# Patient Record
Sex: FEMALE | Race: BLACK OR AFRICAN AMERICAN | NOT HISPANIC OR LATINO | URBAN - METROPOLITAN AREA
[De-identification: names, ages, dates, MRNs, and addresses within clinical notes are randomized per-mention and may not be internally consistent; named-entity substitution may affect disease eponyms.]

---

## 2018-02-05 ENCOUNTER — EMERGENCY (EMERGENCY)
Facility: HOSPITAL | Age: 33
LOS: 1 days | Discharge: HOME | End: 2018-02-05
Attending: EMERGENCY MEDICINE
Payer: SELF-PAY

## 2018-02-05 VITALS
SYSTOLIC BLOOD PRESSURE: 134 MMHG | OXYGEN SATURATION: 100 % | HEART RATE: 106 BPM | WEIGHT: 145.06 LBS | RESPIRATION RATE: 18 BRPM | TEMPERATURE: 98 F | HEIGHT: 66 IN | DIASTOLIC BLOOD PRESSURE: 94 MMHG

## 2018-02-05 VITALS
SYSTOLIC BLOOD PRESSURE: 126 MMHG | DIASTOLIC BLOOD PRESSURE: 75 MMHG | HEART RATE: 88 BPM | OXYGEN SATURATION: 98 % | TEMPERATURE: 98 F | RESPIRATION RATE: 16 BRPM

## 2018-02-05 PROCEDURE — 94640 AIRWAY INHALATION TREATMENT: CPT

## 2018-02-05 PROCEDURE — 99283 EMERGENCY DEPT VISIT LOW MDM: CPT | Mod: 25

## 2018-02-05 PROCEDURE — 99285 EMERGENCY DEPT VISIT HI MDM: CPT

## 2018-02-05 RX ORDER — IPRATROPIUM/ALBUTEROL SULFATE 18-103MCG
6 AEROSOL WITH ADAPTER (GRAM) INHALATION ONCE
Qty: 0 | Refills: 0 | Status: COMPLETED | OUTPATIENT
Start: 2018-02-05 | End: 2018-02-05

## 2018-02-05 RX ADMIN — Medication 50 MILLIGRAM(S): at 13:30

## 2018-02-05 RX ADMIN — Medication 6 MILLILITER(S): at 13:30

## 2018-02-05 NOTE — ED ADULT NURSE NOTE - OBJECTIVE STATEMENT
sent in from urgent care for evaluation asthma exacerbation denies any breathing difficult on arrival

## 2018-02-05 NOTE — ED PROVIDER NOTE - OBJECTIVE STATEMENT
31 y/o female, pmhx asthma c/o asthma exacerbation while standing for bus in cold weather today. Confirms carrying MDI and needing nebulizer machine yesterday, has close follow up with PMD. Pt states she did not have access to her nebulizer, so came to ED. Denies fever/chills, sob/dyspnea, body aches, pregnancy or any other concerns. Has never required intubation for exacerbations.

## 2018-02-05 NOTE — ED PROVIDER NOTE - ATTENDING CONTRIBUTION TO CARE
pt comes in c/o SOb today while waiting for bus, asthma triggered by cold   s/p tx no wheezing   d/c on prednisone

## 2018-02-05 NOTE — ED PROVIDER NOTE - MEDICAL DECISION MAKING DETAILS
pt well appearing, no signs of respiratory distress, vss afebrile, saw pt s/p x2 nebulizer treatments, no wheezing upon exam, pt confirms full resolution, will give steroids as pt has needed nebulizer treatments more than normal, does not need refills of medication, no signs flu will dc home.

## 2019-11-11 NOTE — ED PROVIDER NOTE - TOBACCO USE
Never smoker

## 2020-11-12 ENCOUNTER — INPATIENT (INPATIENT)
Facility: HOSPITAL | Age: 35
LOS: 7 days | Discharge: HOME | DRG: 743 | End: 2020-11-20
Attending: OBSTETRICS & GYNECOLOGY | Admitting: OBSTETRICS & GYNECOLOGY
Payer: MEDICAID

## 2020-11-12 VITALS
HEIGHT: 66 IN | OXYGEN SATURATION: 98 % | HEART RATE: 117 BPM | SYSTOLIC BLOOD PRESSURE: 123 MMHG | WEIGHT: 141.98 LBS | RESPIRATION RATE: 18 BRPM | TEMPERATURE: 99 F | DIASTOLIC BLOOD PRESSURE: 70 MMHG

## 2020-11-12 RX ORDER — SODIUM CHLORIDE 9 MG/ML
1000 INJECTION INTRAMUSCULAR; INTRAVENOUS; SUBCUTANEOUS ONCE
Refills: 0 | Status: COMPLETED | OUTPATIENT
Start: 2020-11-12 | End: 2020-11-12

## 2020-11-12 RX ORDER — SODIUM CHLORIDE 9 MG/ML
3 INJECTION INTRAMUSCULAR; INTRAVENOUS; SUBCUTANEOUS EVERY 8 HOURS
Refills: 0 | Status: DISCONTINUED | OUTPATIENT
Start: 2020-11-12 | End: 2020-11-20

## 2020-11-12 RX ORDER — MORPHINE SULFATE 50 MG/1
6 CAPSULE, EXTENDED RELEASE ORAL ONCE
Refills: 0 | Status: DISCONTINUED | OUTPATIENT
Start: 2020-11-12 | End: 2020-11-12

## 2020-11-12 RX ORDER — ONDANSETRON 8 MG/1
4 TABLET, FILM COATED ORAL ONCE
Refills: 0 | Status: COMPLETED | OUTPATIENT
Start: 2020-11-12 | End: 2020-11-12

## 2020-11-12 RX ORDER — IOHEXOL 300 MG/ML
30 INJECTION, SOLUTION INTRAVENOUS ONCE
Refills: 0 | Status: COMPLETED | OUTPATIENT
Start: 2020-11-12 | End: 2020-11-13

## 2020-11-12 NOTE — ED ADULT TRIAGE NOTE - CHIEF COMPLAINT QUOTE
patient complained of severe abdominal pain, " all over ", patient claims same problem in September and diagnosed with inflammatory bowels.  Denies contact to person with known corona virus

## 2020-11-12 NOTE — ED PROVIDER NOTE - PHYSICAL EXAMINATION
Vital Signs Reviewed  GEN: Uncomfortable appearing, NAD, AAOx3  HEENT: NCAT, EOMI, MMM, Neck Supple  RESP: CTAB, No rales/rhonchi/wheezing  CV: RRR, S1S2, No murmurs  ABD: Diffusely tender with guarding, b/l CVA tenderness, No masses  Extrem/Skin: Equal pulses bilat, No cyanosis/edema/rashes  Neuro: No focal deficits

## 2020-11-12 NOTE — ED PROVIDER NOTE - CLINICAL SUMMARY MEDICAL DECISION MAKING FREE TEXT BOX
33 y/o female presents with severe abdominal pain with guarding and associated vomiting and diarrhea. Labs and CT pending. Given Morphine and Zofran. Will reassess. 33 y/o female presents with severe abdominal pain with guarding and associated vomiting and diarrhea. Labs and CT pending. Given Morphine and Zofran. Will reassess.  Pt with persistent pain and morphine redosed accordingly. Labs show high WBC 18K nml lactate and otw unremarkable. CT concerning for TOA and PID. Giving IV ABx. Pt seen by GYN in the ED and accepted to their service. Pt stable.

## 2020-11-12 NOTE — ED PROVIDER NOTE - OBJECTIVE STATEMENT
35 y/o female h/o asthma, presents a few hours of severe upper central/periumbilical abdominal pain. Reports pain started about 1 hour after eating a cheeseburger. Endorses 4-5 episodes of non-bloody non-bilious emesis, as well as a few episodes of diarrhea. States this is the second time she is having this kind of pain. Denies blood in the stool, vaginal bleeding, vaginal discharge, urinary symptoms, hx of bowel obstruction, hx of abdominal surgery other than a  7 years ago, or other recent illness or hospitalization.

## 2020-11-13 ENCOUNTER — TRANSCRIPTION ENCOUNTER (OUTPATIENT)
Age: 35
End: 2020-11-13

## 2020-11-13 DIAGNOSIS — N70.93 SALPINGITIS AND OOPHORITIS, UNSPECIFIED: ICD-10-CM

## 2020-11-13 PROBLEM — J45.909 UNSPECIFIED ASTHMA, UNCOMPLICATED: Chronic | Status: ACTIVE | Noted: 2018-02-05

## 2020-11-13 LAB
ALBUMIN SERPL ELPH-MCNC: 2.6 G/DL — LOW (ref 3.5–5)
ALP SERPL-CCNC: 93 U/L — SIGNIFICANT CHANGE UP (ref 40–120)
ALT FLD-CCNC: 17 U/L DA — SIGNIFICANT CHANGE UP (ref 10–60)
ANION GAP SERPL CALC-SCNC: 8 MMOL/L — SIGNIFICANT CHANGE UP (ref 5–17)
APPEARANCE UR: CLEAR — SIGNIFICANT CHANGE UP
APTT BLD: 29.8 SEC — SIGNIFICANT CHANGE UP (ref 27.5–35.5)
AST SERPL-CCNC: 13 U/L — SIGNIFICANT CHANGE UP (ref 10–40)
BACTERIA # UR AUTO: ABNORMAL /HPF
BASOPHILS # BLD AUTO: 0.04 K/UL — SIGNIFICANT CHANGE UP (ref 0–0.2)
BASOPHILS NFR BLD AUTO: 0.2 % — SIGNIFICANT CHANGE UP (ref 0–2)
BILIRUB SERPL-MCNC: 0.3 MG/DL — SIGNIFICANT CHANGE UP (ref 0.2–1.2)
BILIRUB UR-MCNC: NEGATIVE — SIGNIFICANT CHANGE UP
BUN SERPL-MCNC: 9 MG/DL — SIGNIFICANT CHANGE UP (ref 7–18)
CALCIUM SERPL-MCNC: 8.8 MG/DL — SIGNIFICANT CHANGE UP (ref 8.4–10.5)
CHLORIDE SERPL-SCNC: 101 MMOL/L — SIGNIFICANT CHANGE UP (ref 96–108)
CO2 SERPL-SCNC: 28 MMOL/L — SIGNIFICANT CHANGE UP (ref 22–31)
COLOR SPEC: YELLOW — SIGNIFICANT CHANGE UP
CREAT SERPL-MCNC: 0.92 MG/DL — SIGNIFICANT CHANGE UP (ref 0.5–1.3)
DIFF PNL FLD: NEGATIVE — SIGNIFICANT CHANGE UP
EOSINOPHIL # BLD AUTO: 0.12 K/UL — SIGNIFICANT CHANGE UP (ref 0–0.5)
EOSINOPHIL NFR BLD AUTO: 0.7 % — SIGNIFICANT CHANGE UP (ref 0–6)
EPI CELLS # UR: SIGNIFICANT CHANGE UP /HPF
GLUCOSE SERPL-MCNC: 122 MG/DL — HIGH (ref 70–99)
GLUCOSE UR QL: NEGATIVE — SIGNIFICANT CHANGE UP
HCG SERPL-ACNC: <1 MIU/ML — SIGNIFICANT CHANGE UP
HCT VFR BLD CALC: 31 % — LOW (ref 34.5–45)
HGB BLD-MCNC: 9.3 G/DL — LOW (ref 11.5–15.5)
HYALINE CASTS # UR AUTO: ABNORMAL /LPF
IMM GRANULOCYTES NFR BLD AUTO: 0.6 % — SIGNIFICANT CHANGE UP (ref 0–1.5)
INR BLD: 1.23 RATIO — HIGH (ref 0.88–1.16)
KETONES UR-MCNC: NEGATIVE — SIGNIFICANT CHANGE UP
LACTATE SERPL-SCNC: 1.1 MMOL/L — SIGNIFICANT CHANGE UP (ref 0.7–2)
LEUKOCYTE ESTERASE UR-ACNC: NEGATIVE — SIGNIFICANT CHANGE UP
LIDOCAIN IGE QN: 102 U/L — SIGNIFICANT CHANGE UP (ref 73–393)
LYMPHOCYTES # BLD AUTO: 0.66 K/UL — LOW (ref 1–3.3)
LYMPHOCYTES # BLD AUTO: 3.7 % — LOW (ref 13–44)
MCHC RBC-ENTMCNC: 23.7 PG — LOW (ref 27–34)
MCHC RBC-ENTMCNC: 30 GM/DL — LOW (ref 32–36)
MCV RBC AUTO: 79.1 FL — LOW (ref 80–100)
MONOCYTES # BLD AUTO: 0.7 K/UL — SIGNIFICANT CHANGE UP (ref 0–0.9)
MONOCYTES NFR BLD AUTO: 3.9 % — SIGNIFICANT CHANGE UP (ref 2–14)
NEUTROPHILS # BLD AUTO: 16.33 K/UL — HIGH (ref 1.8–7.4)
NEUTROPHILS NFR BLD AUTO: 90.9 % — HIGH (ref 43–77)
NITRITE UR-MCNC: NEGATIVE — SIGNIFICANT CHANGE UP
NRBC # BLD: 0 /100 WBCS — SIGNIFICANT CHANGE UP (ref 0–0)
PH UR: 6 — SIGNIFICANT CHANGE UP (ref 5–8)
PLATELET # BLD AUTO: 433 K/UL — HIGH (ref 150–400)
POTASSIUM SERPL-MCNC: 3.6 MMOL/L — SIGNIFICANT CHANGE UP (ref 3.5–5.3)
POTASSIUM SERPL-SCNC: 3.6 MMOL/L — SIGNIFICANT CHANGE UP (ref 3.5–5.3)
PROT SERPL-MCNC: 7.9 G/DL — SIGNIFICANT CHANGE UP (ref 6–8.3)
PROT UR-MCNC: 15
PROTHROM AB SERPL-ACNC: 14.5 SEC — HIGH (ref 10.6–13.6)
RBC # BLD: 3.92 M/UL — SIGNIFICANT CHANGE UP (ref 3.8–5.2)
RBC # FLD: 15.4 % — HIGH (ref 10.3–14.5)
RBC CASTS # UR COMP ASSIST: SIGNIFICANT CHANGE UP /HPF (ref 0–2)
SARS-COV-2 RNA SPEC QL NAA+PROBE: SIGNIFICANT CHANGE UP
SODIUM SERPL-SCNC: 137 MMOL/L — SIGNIFICANT CHANGE UP (ref 135–145)
SP GR SPEC: 1.02 — SIGNIFICANT CHANGE UP (ref 1.01–1.02)
UROBILINOGEN FLD QL: NEGATIVE — SIGNIFICANT CHANGE UP
WBC # BLD: 17.96 K/UL — HIGH (ref 3.8–10.5)
WBC # FLD AUTO: 17.96 K/UL — HIGH (ref 3.8–10.5)
WBC UR QL: SIGNIFICANT CHANGE UP /HPF (ref 0–5)

## 2020-11-13 PROCEDURE — 76830 TRANSVAGINAL US NON-OB: CPT | Mod: 26

## 2020-11-13 PROCEDURE — 99285 EMERGENCY DEPT VISIT HI MDM: CPT

## 2020-11-13 PROCEDURE — 74177 CT ABD & PELVIS W/CONTRAST: CPT | Mod: 26

## 2020-11-13 PROCEDURE — 76856 US EXAM PELVIC COMPLETE: CPT | Mod: 26

## 2020-11-13 RX ORDER — ONDANSETRON 8 MG/1
4 TABLET, FILM COATED ORAL EVERY 6 HOURS
Refills: 0 | Status: DISCONTINUED | OUTPATIENT
Start: 2020-11-13 | End: 2020-11-17

## 2020-11-13 RX ORDER — SODIUM CHLORIDE 9 MG/ML
1000 INJECTION INTRAMUSCULAR; INTRAVENOUS; SUBCUTANEOUS ONCE
Refills: 0 | Status: COMPLETED | OUTPATIENT
Start: 2020-11-13 | End: 2020-11-13

## 2020-11-13 RX ORDER — CEFOTETAN DISODIUM 1 G
2 VIAL (EA) INJECTION ONCE
Refills: 0 | Status: COMPLETED | OUTPATIENT
Start: 2020-11-13 | End: 2020-11-13

## 2020-11-13 RX ORDER — MORPHINE SULFATE 50 MG/1
4 CAPSULE, EXTENDED RELEASE ORAL ONCE
Refills: 0 | Status: DISCONTINUED | OUTPATIENT
Start: 2020-11-13 | End: 2020-11-13

## 2020-11-13 RX ORDER — ACETAMINOPHEN 500 MG
1000 TABLET ORAL ONCE
Refills: 0 | Status: COMPLETED | OUTPATIENT
Start: 2020-11-13 | End: 2020-11-13

## 2020-11-13 RX ORDER — SIMETHICONE 80 MG/1
80 TABLET, CHEWABLE ORAL EVERY 4 HOURS
Refills: 0 | Status: DISCONTINUED | OUTPATIENT
Start: 2020-11-13 | End: 2020-11-20

## 2020-11-13 RX ORDER — ACETAMINOPHEN 500 MG
650 TABLET ORAL EVERY 6 HOURS
Refills: 0 | Status: DISCONTINUED | OUTPATIENT
Start: 2020-11-13 | End: 2020-11-13

## 2020-11-13 RX ORDER — CEFOTETAN DISODIUM 1 G
2 VIAL (EA) INJECTION EVERY 12 HOURS
Refills: 0 | Status: DISCONTINUED | OUTPATIENT
Start: 2020-11-14 | End: 2020-11-17

## 2020-11-13 RX ORDER — MORPHINE SULFATE 50 MG/1
4 CAPSULE, EXTENDED RELEASE ORAL EVERY 4 HOURS
Refills: 0 | Status: DISCONTINUED | OUTPATIENT
Start: 2020-11-13 | End: 2020-11-17

## 2020-11-13 RX ORDER — METOCLOPRAMIDE HCL 10 MG
10 TABLET ORAL EVERY 6 HOURS
Refills: 0 | Status: DISCONTINUED | OUTPATIENT
Start: 2020-11-13 | End: 2020-11-17

## 2020-11-13 RX ORDER — ACETAMINOPHEN 500 MG
650 TABLET ORAL ONCE
Refills: 0 | Status: COMPLETED | OUTPATIENT
Start: 2020-11-13 | End: 2020-11-13

## 2020-11-13 RX ORDER — KETOROLAC TROMETHAMINE 30 MG/ML
15 SYRINGE (ML) INJECTION EVERY 6 HOURS
Refills: 0 | Status: DISCONTINUED | OUTPATIENT
Start: 2020-11-13 | End: 2020-11-17

## 2020-11-13 RX ORDER — SODIUM CHLORIDE 9 MG/ML
1000 INJECTION, SOLUTION INTRAVENOUS
Refills: 0 | Status: DISCONTINUED | OUTPATIENT
Start: 2020-11-13 | End: 2020-11-16

## 2020-11-13 RX ORDER — CEFOTETAN DISODIUM 1 G
VIAL (EA) INJECTION
Refills: 0 | Status: DISCONTINUED | OUTPATIENT
Start: 2020-11-13 | End: 2020-11-17

## 2020-11-13 RX ADMIN — Medication 100 GRAM(S): at 23:57

## 2020-11-13 RX ADMIN — MORPHINE SULFATE 6 MILLIGRAM(S): 50 CAPSULE, EXTENDED RELEASE ORAL at 02:29

## 2020-11-13 RX ADMIN — Medication 15 MILLIGRAM(S): at 12:25

## 2020-11-13 RX ADMIN — IOHEXOL 30 MILLILITER(S): 300 INJECTION, SOLUTION INTRAVENOUS at 01:42

## 2020-11-13 RX ADMIN — SODIUM CHLORIDE 1000 MILLILITER(S): 9 INJECTION INTRAMUSCULAR; INTRAVENOUS; SUBCUTANEOUS at 07:13

## 2020-11-13 RX ADMIN — SODIUM CHLORIDE 125 MILLILITER(S): 9 INJECTION, SOLUTION INTRAVENOUS at 15:35

## 2020-11-13 RX ADMIN — MORPHINE SULFATE 4 MILLIGRAM(S): 50 CAPSULE, EXTENDED RELEASE ORAL at 06:11

## 2020-11-13 RX ADMIN — Medication 400 MILLIGRAM(S): at 23:55

## 2020-11-13 RX ADMIN — Medication 15 MILLIGRAM(S): at 12:40

## 2020-11-13 RX ADMIN — Medication 110 MILLIGRAM(S): at 23:57

## 2020-11-13 RX ADMIN — SODIUM CHLORIDE 1000 MILLILITER(S): 9 INJECTION INTRAMUSCULAR; INTRAVENOUS; SUBCUTANEOUS at 01:42

## 2020-11-13 RX ADMIN — Medication 15 MILLIGRAM(S): at 19:40

## 2020-11-13 RX ADMIN — Medication 110 MILLIGRAM(S): at 06:59

## 2020-11-13 RX ADMIN — SODIUM CHLORIDE 3 MILLILITER(S): 9 INJECTION INTRAMUSCULAR; INTRAVENOUS; SUBCUTANEOUS at 21:04

## 2020-11-13 RX ADMIN — MORPHINE SULFATE 4 MILLIGRAM(S): 50 CAPSULE, EXTENDED RELEASE ORAL at 17:41

## 2020-11-13 RX ADMIN — SODIUM CHLORIDE 3 MILLILITER(S): 9 INJECTION INTRAMUSCULAR; INTRAVENOUS; SUBCUTANEOUS at 07:12

## 2020-11-13 RX ADMIN — Medication 15 MILLIGRAM(S): at 19:55

## 2020-11-13 RX ADMIN — MORPHINE SULFATE 6 MILLIGRAM(S): 50 CAPSULE, EXTENDED RELEASE ORAL at 01:59

## 2020-11-13 RX ADMIN — Medication 100 GRAM(S): at 07:00

## 2020-11-13 RX ADMIN — MORPHINE SULFATE 4 MILLIGRAM(S): 50 CAPSULE, EXTENDED RELEASE ORAL at 05:41

## 2020-11-13 RX ADMIN — Medication 650 MILLIGRAM(S): at 05:41

## 2020-11-13 RX ADMIN — MORPHINE SULFATE 4 MILLIGRAM(S): 50 CAPSULE, EXTENDED RELEASE ORAL at 17:55

## 2020-11-13 RX ADMIN — Medication 650 MILLIGRAM(S): at 06:11

## 2020-11-13 RX ADMIN — SIMETHICONE 80 MILLIGRAM(S): 80 TABLET, CHEWABLE ORAL at 21:05

## 2020-11-13 RX ADMIN — ONDANSETRON 4 MILLIGRAM(S): 8 TABLET, FILM COATED ORAL at 01:42

## 2020-11-13 NOTE — H&P ADULT - HISTORY OF PRESENT ILLNESS
HPI: 35 y/o   LMP: 10/26/2020 presents to the ED with acute onset of acute abdominal pain since yesterday. Pt states she had a hamburger earlier yesterday afternoon and after she ate she started to feel severe abdominal pain with 4 episodes of vomiting and diarrhea. Pt states she had similar pain 1 month ago after eating beef and went to [facility] ED and was told she had "abdominal inflammation." Pt denies any vaginal bleeding, vaginal discharge, cp, sob, dizziness, palpitations, n/v/d/c, fever, chills or any other complaints.   GYN care at [facility] (Dr. Gem Crockett)    pob/gynhx:   1 mETOP  1 etop w d&c   1    2013 1 c/s   Pt states she has one fibriod and ovarian cyst, pt denies std's, abn pap smear  reports regular menses, lasting 5 days long  pmhx: asthma, anemia   pshx: c/s x1, d&c x1  all: pineapple, kiwi, shellfish   meds: no meds  sochx: Pt denies etoh/drug/tobacco use  pt denies h/o anxiety or depression    REVIEW OF SYSTEMS: see HPI	    PE:  Vital Signs Last 24 Hrs  T(C): 38.4 (2020 05:14), Max: 38.4 (2020 05:14)  T(F): 101.1 (2020 05:14), Max: 101.1 (2020 05:14)  HR: 120 (2020 05:14) (117 - 120)  BP: 126/81 (2020 05:14) (123/70 - 126/81)  BP(mean): --  RR: 18 (2020 05:14) (18 - 18)  SpO2: 98% (2020 05:14) (98% - 98%)    Gen: A&Ox3, NAD, appears in pain and ambulating very slowly  Abd/pelvic exam: unable to perform at this time, pt was called to radiology for pelvic sonogram     LABS:                        9.3    17.96 )-----------( 433      ( 2020 01:16 )             31.0     11-13    137  |  101  |  9   ----------------------------<  122<H>  3.6   |  28  |  0.92    Ca    8.8      2020 01:16    TPro  7.9  /  Alb  2.6<L>  /  TBili  0.3  /  DBili  x   /  AST  13  /  ALT  17  /  AlkPhos  93  11-13    PT/INR - ( 2020 01:16 )   PT: 14.5 sec;   INR: 1.23 ratio         PTT - ( 2020 01:16 )  PTT:29.8 sec  Urinalysis Basic - ( 2020 01:16 )    Color: Yellow / Appearance: Clear / S.020 / pH: x  Gluc: x / Ketone: Negative  / Bili: Negative / Urobili: Negative   Blood: x / Protein: 15 / Nitrite: Negative   Leuk Esterase: Negative / RBC: 0-2 /HPF / WBC 0-2 /HPF   Sq Epi: x / Non Sq Epi: Few /HPF / Bacteria: Few /HPF

## 2020-11-13 NOTE — H&P ADULT - PROBLEM SELECTOR PLAN 1
-admit for IV antibiotics for left TOA  -start IV cefotetan and doxycycline   -f/u pelvic sonogram, reassess patient after patient returns from sonogram   -f/u cultures  -pt counseled of possible surgical intervention if unresponsive to IV antibiotics   -pt seen at bedside with Dr. Cornejo, attending on call -admit for IV antibiotics for left TOA  -start IV cefotetan and doxycycline   -f/u pelvic sonogram, reassess patient after patient returns from sonogram   -f/u cultures  -pt counseled of possible surgical intervention if unresponsive to IV antibiotics   -pt seen at bedside and case discussed with Dr. Cornejo, attending on call

## 2020-11-13 NOTE — PATIENT PROFILE ADULT - NSPRESCRALCAMT_GEN_A_NUR
Action 1: Continue Start Regimen: Ketoconazole shampoo with Tsal \\nFluocinonide solution qhs on flares Detail Level: Zone 1 or 2

## 2020-11-13 NOTE — CONSULT NOTE ADULT - ASSESSMENT
A/p: 35 y/o  LMP: 10/26/2020 presents to ED with acute onset of severe diffuse abdominal pain, vomiting and diarrhea, elevated WBC count, fever 101.1 and high suspicion of left TOA by CT scan findings   -admit for IV antibiotics for left TOA  -start IV cefotetan and doxycycline   -f/u pelvic sonogram, reassess patient after patient returns from sonogram   -f/u cultures  -pt counseled of possible surgical intervention if unresponsive to IV antibiotics   -pt seen at bedside with Dr. Cornejo, attending on call

## 2020-11-13 NOTE — H&P ADULT - ASSESSMENT
A/p: 33 y/o  LMP: 10/26/2020 presents to ED with acute onset of severe diffuse abdominal pain, vomiting and diarrhea, elevated WBC count, fever 101.1 and high suspicion of left TOA by CT scan findings

## 2020-11-13 NOTE — H&P ADULT - NSHPLABSRESULTS_GEN_ALL_CORE
c< from: CT Abdomen and Pelvis w/ Oral Cont and w/ IV Cont (11.13.20 @ 04:58) >    EXAM:  CT ABDOMEN AND PELVIS OC IC                            PROCEDURE DATE:  11/13/2020        INTERPRETATION:  CLINICAL INFORMATION:  Abdominal pain, leukocytosis  COMPARISON: None.  PROCEDURE:  CT of the Abdomen and Pelvis was performed with intravenous contrast.  Intravenous contrast:   90 ml Omnipaque 350.  Oral contrast: positive contrast was administered.  Sagittal and coronal reformats were performed.    FINDINGS:  LIMITATIONS: None.    LOWER CHEST: Within normal limits.  ABDOMINAL WALL: Within normal limits.  VESSELS: Within normal limits.  BOWEL: Loops of small bowel with moderate mural thickening of loss of the abdomen and perienteric fat stranding/fluid. No gross abnormalities of the colon. Appendix normal.    LIVER: Within normal limits.  BILE DUCTS: Normal caliber  GALLBLADDER: Within normal limits.  SPLEEN: Within normal limits.  PANCREAS: Within normal limits.  ADRENALS: Within normal limits.  KIDNEYS/URETERS: No renal stones or hydronephrosis.    BLADDER: Within normal limits.  REPRODUCTIVE ORGANS: 3.7 cm hyperdense lesion within the dorsal myometrium, likely fibroid. Right ovary is enlarged, measuring approximately 5.2 x 3.8 x 5.0 cm. The left ovary is enlarged by an irregular, peripherally enhancing fluid collection measuring approximately 6.0 x 3.6 x 4.5 cm. Additional smaller fluid collection in the left adnexa measuring 2.8 cm (series 2, image 101).    PERITONEUM: Moderate free fluid in the pelvis with possible peritoneal enhancement.  RETROPERITONEUM/LYMPH NODES: Nonspecific mildly enlarged left retroperitoneal lymph nodes measuring up to 1.1 cm in short axis.    BONES: No acute osseous pathology.    IMPRESSION:  1. Large irregular fluid collection expanding the left ovary. Additional small left adnexal fluid collection. Moderate free fluid in the pelvis with possible peritoneal enhancement. The right ovary is also enlarged. Pelvic ultrasound is recommended to evaluate. Primary considerations on the left are ruptured hemorrhagic cyst and tubo-ovarian abscess.  2. The appearance is suspicious for enteritis.  3. Probable 3.7 cm fibroid      ERIC LEIVA MD; Attending Radiologist

## 2020-11-14 ENCOUNTER — RESULT REVIEW (OUTPATIENT)
Age: 35
End: 2020-11-14

## 2020-11-14 DIAGNOSIS — R00.0 TACHYCARDIA, UNSPECIFIED: ICD-10-CM

## 2020-11-14 DIAGNOSIS — J45.909 UNSPECIFIED ASTHMA, UNCOMPLICATED: ICD-10-CM

## 2020-11-14 LAB
ABO RH CONFIRMATION: SIGNIFICANT CHANGE UP
APTT BLD: 26.9 SEC — LOW (ref 27.5–35.5)
BASOPHILS # BLD AUTO: 0.04 K/UL — SIGNIFICANT CHANGE UP (ref 0–0.2)
BASOPHILS NFR BLD AUTO: 0.2 % — SIGNIFICANT CHANGE UP (ref 0–2)
BLD GP AB SCN SERPL QL: SIGNIFICANT CHANGE UP
CK MB BLD-MCNC: <2.4 % — SIGNIFICANT CHANGE UP (ref 0–3.5)
CK MB CFR SERPL CALC: <1 NG/ML — SIGNIFICANT CHANGE UP (ref 0–3.6)
CK SERPL-CCNC: 42 U/L — SIGNIFICANT CHANGE UP (ref 21–215)
EOSINOPHIL # BLD AUTO: 0.01 K/UL — SIGNIFICANT CHANGE UP (ref 0–0.5)
EOSINOPHIL NFR BLD AUTO: 0 % — SIGNIFICANT CHANGE UP (ref 0–6)
HCT VFR BLD CALC: 33 % — LOW (ref 34.5–45)
HGB BLD-MCNC: 9.8 G/DL — LOW (ref 11.5–15.5)
IMM GRANULOCYTES NFR BLD AUTO: 0.9 % — SIGNIFICANT CHANGE UP (ref 0–1.5)
INR BLD: 1.51 RATIO — HIGH (ref 0.88–1.16)
LYMPHOCYTES # BLD AUTO: 1.5 K/UL — SIGNIFICANT CHANGE UP (ref 1–3.3)
LYMPHOCYTES # BLD AUTO: 7.1 % — LOW (ref 13–44)
MCHC RBC-ENTMCNC: 23.4 PG — LOW (ref 27–34)
MCHC RBC-ENTMCNC: 29.7 GM/DL — LOW (ref 32–36)
MCV RBC AUTO: 78.9 FL — LOW (ref 80–100)
MONOCYTES # BLD AUTO: 1.11 K/UL — HIGH (ref 0–0.9)
MONOCYTES NFR BLD AUTO: 5.2 % — SIGNIFICANT CHANGE UP (ref 2–14)
NEUTROPHILS # BLD AUTO: 18.41 K/UL — HIGH (ref 1.8–7.4)
NEUTROPHILS NFR BLD AUTO: 86.6 % — HIGH (ref 43–77)
NRBC # BLD: 0 /100 WBCS — SIGNIFICANT CHANGE UP (ref 0–0)
PLATELET # BLD AUTO: 435 K/UL — HIGH (ref 150–400)
PROTHROM AB SERPL-ACNC: 17.6 SEC — HIGH (ref 10.6–13.6)
RBC # BLD: 4.18 M/UL — SIGNIFICANT CHANGE UP (ref 3.8–5.2)
RBC # FLD: 15.8 % — HIGH (ref 10.3–14.5)
SARS-COV-2 IGG SERPL QL IA: NEGATIVE — SIGNIFICANT CHANGE UP
SARS-COV-2 IGM SERPL IA-ACNC: 0.11 INDEX — SIGNIFICANT CHANGE UP
TROPONIN I SERPL-MCNC: <0.015 NG/ML — SIGNIFICANT CHANGE UP (ref 0–0.04)
WBC # BLD: 21.26 K/UL — HIGH (ref 3.8–10.5)
WBC # FLD AUTO: 21.26 K/UL — HIGH (ref 3.8–10.5)

## 2020-11-14 PROCEDURE — 99233 SBSQ HOSP IP/OBS HIGH 50: CPT | Mod: GC

## 2020-11-14 RX ORDER — MONTELUKAST 4 MG/1
10 TABLET, CHEWABLE ORAL AT BEDTIME
Refills: 0 | Status: DISCONTINUED | OUTPATIENT
Start: 2020-11-14 | End: 2020-11-20

## 2020-11-14 RX ORDER — ACETAMINOPHEN 500 MG
1000 TABLET ORAL ONCE
Refills: 0 | Status: COMPLETED | OUTPATIENT
Start: 2020-11-14 | End: 2020-11-14

## 2020-11-14 RX ORDER — HYDROMORPHONE HYDROCHLORIDE 2 MG/ML
0.5 INJECTION INTRAMUSCULAR; INTRAVENOUS; SUBCUTANEOUS
Refills: 0 | Status: DISCONTINUED | OUTPATIENT
Start: 2020-11-14 | End: 2020-11-14

## 2020-11-14 RX ORDER — HYDROMORPHONE HYDROCHLORIDE 2 MG/ML
1 INJECTION INTRAMUSCULAR; INTRAVENOUS; SUBCUTANEOUS
Refills: 0 | Status: DISCONTINUED | OUTPATIENT
Start: 2020-11-14 | End: 2020-11-14

## 2020-11-14 RX ORDER — ONDANSETRON 8 MG/1
4 TABLET, FILM COATED ORAL ONCE
Refills: 0 | Status: DISCONTINUED | OUTPATIENT
Start: 2020-11-14 | End: 2020-11-14

## 2020-11-14 RX ADMIN — SODIUM CHLORIDE 3 MILLILITER(S): 9 INJECTION INTRAMUSCULAR; INTRAVENOUS; SUBCUTANEOUS at 22:08

## 2020-11-14 RX ADMIN — MORPHINE SULFATE 4 MILLIGRAM(S): 50 CAPSULE, EXTENDED RELEASE ORAL at 14:30

## 2020-11-14 RX ADMIN — Medication 1000 MILLIGRAM(S): at 00:30

## 2020-11-14 RX ADMIN — Medication 400 MILLIGRAM(S): at 17:44

## 2020-11-14 RX ADMIN — SODIUM CHLORIDE 3 MILLILITER(S): 9 INJECTION INTRAMUSCULAR; INTRAVENOUS; SUBCUTANEOUS at 05:11

## 2020-11-14 RX ADMIN — Medication 110 MILLIGRAM(S): at 21:46

## 2020-11-14 RX ADMIN — Medication 100 GRAM(S): at 08:14

## 2020-11-14 RX ADMIN — Medication 15 MILLIGRAM(S): at 03:30

## 2020-11-14 RX ADMIN — MORPHINE SULFATE 4 MILLIGRAM(S): 50 CAPSULE, EXTENDED RELEASE ORAL at 06:59

## 2020-11-14 RX ADMIN — HYDROMORPHONE HYDROCHLORIDE 1 MILLIGRAM(S): 2 INJECTION INTRAMUSCULAR; INTRAVENOUS; SUBCUTANEOUS at 22:30

## 2020-11-14 RX ADMIN — Medication 15 MILLIGRAM(S): at 03:11

## 2020-11-14 RX ADMIN — MORPHINE SULFATE 4 MILLIGRAM(S): 50 CAPSULE, EXTENDED RELEASE ORAL at 06:44

## 2020-11-14 RX ADMIN — Medication 110 MILLIGRAM(S): at 08:14

## 2020-11-14 RX ADMIN — SIMETHICONE 80 MILLIGRAM(S): 80 TABLET, CHEWABLE ORAL at 03:12

## 2020-11-14 RX ADMIN — Medication 100 GRAM(S): at 21:45

## 2020-11-14 RX ADMIN — HYDROMORPHONE HYDROCHLORIDE 1 MILLIGRAM(S): 2 INJECTION INTRAMUSCULAR; INTRAVENOUS; SUBCUTANEOUS at 22:15

## 2020-11-14 RX ADMIN — SODIUM CHLORIDE 3 MILLILITER(S): 9 INJECTION INTRAMUSCULAR; INTRAVENOUS; SUBCUTANEOUS at 13:36

## 2020-11-14 RX ADMIN — Medication 15 MILLIGRAM(S): at 09:27

## 2020-11-14 NOTE — CONSULT NOTE ADULT - ATTENDING COMMENTS
Patient was interviewed and examined at the bedside     We were asked to see her because of persistent tachycardia.     Alert, cooperative woman, drinking tea  Vital Signs Last 24 Hrs  T(C): 37.5 (14 Nov 2020 19:00), Max: 38.6 (14 Nov 2020 00:03)  T(F): 99.5 (14 Nov 2020 19:00), Max: 101.5 (14 Nov 2020 00:03)  HR: 135 (14 Nov 2020 19:00) (102 - 151)  BP: 136/85 (14 Nov 2020 19:00) (113/73 - 139/85)  BP(mean): --  RR: 18 (14 Nov 2020 19:00) (17 - 19)  SpO2: 100% (14 Nov 2020 19:00) (94% - 100%)  Lungs, decreased bs, no wheeze or rales  Cor, RRR at 120/minute  Abdomen, diffusely tender, no rebound, bs, present, patient's heartbeat is prominent via abdomen  Neurological, intact                        9.8    21.26 )-----------( 435      ( 14 Nov 2020 09:21 )             33.0   11-13    137  |  101  |  9   ----------------------------<  122<H>  3.6   |  28  |  0.92    Ca    8.8      13 Nov 2020 01:16    TPro  7.9  /  Alb  2.6<L>  /  TBili  0.3  /  DBili  x   /  AST  13  /  ALT  17  /  AlkPhos  93  11-13  < from: US Pelvis Complete (US Pelvis Complete .) (11.13.20 @ 06:58) >    IMPRESSION:  The study is unfortunately fairly limited. The dominant lesion in the left axilla is not well characterized and again probably represents a ruptured hemorrhagic cyst or tubo-ovarian abscess. Arteriovenous flow is present bilaterally.    < end of copied text >    r< from: CT Abdomen and Pelvis w/ Oral Cont and w/ IV Cont (11.13.20 @ 04:58) >    IMPRESSION:  1. Large irregular fluid collection expanding the left ovary. Additional small left adnexal fluid collection. Moderate free fluid in the pelvis with possible peritoneal enhancement. The right ovary is also enlarged. Pelvic ultrasound is recommended to evaluate. Primary considerations on the left are ruptured hemorrhagic cyst and tubo-ovarian abscess.  2. The appearance is suspicious for enteritis.  3. Probable 3.7 cm fibroid.    < end of copied text >    IMP:  Tachycardia, likely secondary to sepsis, possibly exacerbated by the use of albuterol.  Patient has clinical and radiological evidence of PID          with TOA and possible rupture.            No wheezing, no hypoxia.  Asthma is not active, at present.   Suggest, resume montelukast               Agree that surgical intervention for source control is appropriate, as per GYN.                 Patient has taken liquids about 5:00 PM.

## 2020-11-14 NOTE — CONSULT NOTE ADULT - ASSESSMENT
Patient is a 34y old  Female who presents with a chief complaint of acute onset severe abdominal pain, vomiting, high suspicion for left TOA (14 Nov 2020 16:39)  ADmitted for L tuboovarian abscess and peritonitis.    IM team was consulted for persistent tachycardia.

## 2020-11-14 NOTE — PROGRESS NOTE ADULT - SUBJECTIVE AND OBJECTIVE BOX
patient states she feels better than yesterday, had fever at midnight.   complains of abdominal pain since Thursday.     abdomen: distended, diffuse guarding, no rebond

## 2020-11-14 NOTE — CONSULT NOTE ADULT - PROBLEM SELECTOR RECOMMENDATION 3
Pt has PMH of Asthma  Pt takes Montelukast 10mg at home and ALlbuterol pump PRN  Pt advised to hold the albuterol pump as it is contributing to the tachycardia   Recc to restart home Montelukast 10mg bedtime.

## 2020-11-14 NOTE — BRIEF OPERATIVE NOTE - OPERATION/FINDINGS
7x7 cm left tubo-ovarian abscess adhere to the left pelvic wall  Exudate on bowel proximate to the abscess  Right ovary wnl with exudate

## 2020-11-14 NOTE — BRIEF OPERATIVE NOTE - NSICDXBRIEFPROCEDURE_GEN_ALL_CORE_FT
PROCEDURES:  Open resection of left fallopian tube 14-Nov-2020 22:27:22  Tyson Wang  Exploratory laparotomy 14-Nov-2020 22:25:31  Tyson Wang

## 2020-11-14 NOTE — CONSULT NOTE ADULT - PROBLEM SELECTOR RECOMMENDATION 2
Pt noted to be tachycardia x 2 days  Pt mentions occasional palpitations after taking albuterol inh or Neb treatments  HR -110 - 120   ekg- NSR  Tachycardia likely due to Underlying pelvic infection vs Albuterol inhaler  Pt recommended to stop the albutertol pump.   Monitor HR   F/U Trops

## 2020-11-14 NOTE — PROGRESS NOTE ADULT - SUBJECTIVE AND OBJECTIVE BOX
went to see Mrs Damon, she states her abdominal pain has improved, She is tolerating clear diet.   Her HR is in the range of 120s since 2 pm, BP wnl.   Patient took albuterol before 2 pm for her asthma.   She also has hx of anxiety.   Will continue to monitor her VS and symptoms.    went to see Mrs Damon, she states her abdominal pain has improved, She is tolerating clear diet.   Her HR is in the range of 120s since 2 pm, BP wnl.   Patient took albuterol before 2 pm for her asthma.   She also has hx of anxiety.   EKG shows sinus tachy   Will continue to monitor her VS and symptoms.

## 2020-11-14 NOTE — PROGRESS NOTE ADULT - ASSESSMENT
left TOA on IV antibiotics   s/p fever at midnight   NPO for now  CBC stat   type and screen sent   IV antibiotics now   will continue to monitor, possible surgical intervention if no improvement of symptoms

## 2020-11-14 NOTE — CHART NOTE - NSCHARTNOTEFT_GEN_A_CORE
patient has develop a second episode of fever, her HR has increased to 150s  we are concern of a possible ruptured or sepsis; we have discussed this possibility with the patient and recommend to do a diagnostic laparoscopy with removal of TOA, possible salpingectomy/oophorectomy   patient agrees with plan   consent taken   OR notified      Vital Signs Last 24 Hrs  T(C): 38.6 (14 Nov 2020 17:33), Max: 38.6 (14 Nov 2020 00:03)  T(F): 101.5 (14 Nov 2020 17:33), Max: 101.5 (14 Nov 2020 00:03)  HR: 151 (14 Nov 2020 17:33) (102 - 151)  BP: 139/85 (14 Nov 2020 17:33) (113/73 - 139/85)  BP(mean): --  RR: 18 (14 Nov 2020 17:33) (17 - 19)  SpO2: 100% (14 Nov 2020 17:33) (94% - 100%)

## 2020-11-14 NOTE — CHART NOTE - NSCHARTNOTEFT_GEN_A_CORE
I have explained to the patient in detailed the nature of the operative procedure to be performed.  The patient has been medically cleared.  The purpose of this procedure is twofold, first: diagnostic and second: therapeutic.  An exploratory laparotomy will be performed with removal of TOA, possible oophorectomy.  Other procedures are possible according to the pathology encountered.  The patient was told that the uterus will need to be removed to completely take care of the infection. She is reluctant to have a hysterectomy so we will try not to perform this procedure. She is  aware that leaving the uterus increases the likelihood of persistent infection.  The patient showed understanding, all questions answered and signed informed consent.

## 2020-11-14 NOTE — CONSULT NOTE ADULT - CONSULT REASON
Med: Tizanidine  LOV: 10/02/17 for LUQ abdominal pain  Last Refill: 08/17/17, # 90 w1r  Next appt: None    Routed to PCP to advise if okay for 6 months  
Ok to refill    
RX faxed.  
acute onset of diffuse abdominal pain, vomiting, and high suspcision for TOA
tachycardia

## 2020-11-14 NOTE — CHART NOTE - NSCHARTNOTEFT_GEN_A_CORE
Pt was re-evaluated at bedside with Dr. Muse.  She states her abd pain remains the same with occasional sharper pain. She c/o feeling bloated. Last BM was 2 days ago and she usually has daily BMs. Denies CP, SOB, palpitations, hx of cardiac disease, dizziness, h/a, vaginal bleeding, N/V. Admits to anxiety but is not on any medications.   Vital Signs Last 24 Hrs  T(C): 37.4 (14 Nov 2020 14:02), Max: 38.6 (14 Nov 2020 00:03)  T(F): 99.3 (14 Nov 2020 14:02), Max: 101.5 (14 Nov 2020 00:03)  HR: 121 (14 Nov 2020 14:02) (99 - 127)  BP: 121/74 (14 Nov 2020 14:02) (113/62 - 132/70)  BP(mean): --  RR: 18 (14 Nov 2020 14:02) (17 - 19)  SpO2: 97% (14 Nov 2020 14:02) (94% - 100%)    EKG sinus tachy, reviewed by anesthesia team  wbc increased from 18--->21    Plan is to continue antibiotics  NPO after midnight  Clear diet now  Rpt CBC in am Pt was re-evaluated at bedside with Dr. Muse.  She states her abd pain remains the same with occasional sharper pain. She c/o feeling bloated. Last BM was 2 days ago and she usually has daily BMs. Denies CP, SOB, palpitations, hx of cardiac disease, dizziness, h/a, vaginal bleeding, N/V. Admits to anxiety but is not on any medications.   Vital Signs Last 24 Hrs  T(C): 37.4 (14 Nov 2020 14:02), Max: 38.6 (14 Nov 2020 00:03)  T(F): 99.3 (14 Nov 2020 14:02), Max: 101.5 (14 Nov 2020 00:03)  HR: 121 (14 Nov 2020 14:02) (99 - 127)  BP: 121/74 (14 Nov 2020 14:02) (113/62 - 132/70)  BP(mean): --  RR: 18 (14 Nov 2020 14:02) (17 - 19)  SpO2: 97% (14 Nov 2020 14:02) (94% - 100%)    EKG sinus tachy, reviewed by anesthesia team  wbc increased from 18--->21    Plan is to continue antibiotics  NPO after midnight  Clear diet now  Rpt CBC in am  Will call for medical consult

## 2020-11-14 NOTE — CONSULT NOTE ADULT - PROBLEM SELECTOR RECOMMENDATION 9
Pt admitted for abd pain x 2 days a/w N/V/D on admission  Abd pain and symptoms resolving now  CT abd/pel- L tuboovarian mass, with R ovary enlargement, concerns of peritonitis  c/w iv abx doxycyclin and cefotetan   Pt to be scheduled for OR procedure as perOBG/GYN team

## 2020-11-15 DIAGNOSIS — D64.9 ANEMIA, UNSPECIFIED: ICD-10-CM

## 2020-11-15 LAB
ALBUMIN SERPL ELPH-MCNC: 1.5 G/DL — LOW (ref 3.5–5)
ALP SERPL-CCNC: 76 U/L — SIGNIFICANT CHANGE UP (ref 40–120)
ALT FLD-CCNC: 7 U/L DA — LOW (ref 10–60)
ANION GAP SERPL CALC-SCNC: 10 MMOL/L — SIGNIFICANT CHANGE UP (ref 5–17)
AST SERPL-CCNC: 12 U/L — SIGNIFICANT CHANGE UP (ref 10–40)
BASOPHILS # BLD AUTO: 0.04 K/UL — SIGNIFICANT CHANGE UP (ref 0–0.2)
BASOPHILS NFR BLD AUTO: 0.2 % — SIGNIFICANT CHANGE UP (ref 0–2)
BILIRUB SERPL-MCNC: 0.5 MG/DL — SIGNIFICANT CHANGE UP (ref 0.2–1.2)
BUN SERPL-MCNC: 5 MG/DL — LOW (ref 7–18)
CALCIUM SERPL-MCNC: 8 MG/DL — LOW (ref 8.4–10.5)
CHLORIDE SERPL-SCNC: 103 MMOL/L — SIGNIFICANT CHANGE UP (ref 96–108)
CO2 SERPL-SCNC: 24 MMOL/L — SIGNIFICANT CHANGE UP (ref 22–31)
CREAT SERPL-MCNC: 0.82 MG/DL — SIGNIFICANT CHANGE UP (ref 0.5–1.3)
EOSINOPHIL # BLD AUTO: 0.04 K/UL — SIGNIFICANT CHANGE UP (ref 0–0.5)
EOSINOPHIL NFR BLD AUTO: 0.2 % — SIGNIFICANT CHANGE UP (ref 0–6)
GLUCOSE SERPL-MCNC: 88 MG/DL — SIGNIFICANT CHANGE UP (ref 70–99)
HCT VFR BLD CALC: 26.7 % — LOW (ref 34.5–45)
HCT VFR BLD CALC: 27.5 % — LOW (ref 34.5–45)
HGB BLD-MCNC: 7.9 G/DL — LOW (ref 11.5–15.5)
HGB BLD-MCNC: 8.1 G/DL — LOW (ref 11.5–15.5)
IMM GRANULOCYTES NFR BLD AUTO: 1.8 % — HIGH (ref 0–1.5)
LYMPHOCYTES # BLD AUTO: 1.34 K/UL — SIGNIFICANT CHANGE UP (ref 1–3.3)
LYMPHOCYTES # BLD AUTO: 7.9 % — LOW (ref 13–44)
MCHC RBC-ENTMCNC: 22.9 PG — LOW (ref 27–34)
MCHC RBC-ENTMCNC: 23.2 PG — LOW (ref 27–34)
MCHC RBC-ENTMCNC: 29.5 GM/DL — LOW (ref 32–36)
MCHC RBC-ENTMCNC: 29.6 GM/DL — LOW (ref 32–36)
MCV RBC AUTO: 77.4 FL — LOW (ref 80–100)
MCV RBC AUTO: 78.8 FL — LOW (ref 80–100)
MONOCYTES # BLD AUTO: 0.93 K/UL — HIGH (ref 0–0.9)
MONOCYTES NFR BLD AUTO: 5.5 % — SIGNIFICANT CHANGE UP (ref 2–14)
NEUTROPHILS # BLD AUTO: 14.35 K/UL — HIGH (ref 1.8–7.4)
NEUTROPHILS NFR BLD AUTO: 84.4 % — HIGH (ref 43–77)
NRBC # BLD: 0 /100 WBCS — SIGNIFICANT CHANGE UP (ref 0–0)
NRBC # BLD: 0 /100 WBCS — SIGNIFICANT CHANGE UP (ref 0–0)
PLATELET # BLD AUTO: 369 K/UL — SIGNIFICANT CHANGE UP (ref 150–400)
PLATELET # BLD AUTO: 385 K/UL — SIGNIFICANT CHANGE UP (ref 150–400)
POTASSIUM SERPL-MCNC: 3.3 MMOL/L — LOW (ref 3.5–5.3)
POTASSIUM SERPL-SCNC: 3.3 MMOL/L — LOW (ref 3.5–5.3)
PROT SERPL-MCNC: 6.1 G/DL — SIGNIFICANT CHANGE UP (ref 6–8.3)
RBC # BLD: 3.45 M/UL — LOW (ref 3.8–5.2)
RBC # BLD: 3.49 M/UL — LOW (ref 3.8–5.2)
RBC # FLD: 15.7 % — HIGH (ref 10.3–14.5)
RBC # FLD: 15.9 % — HIGH (ref 10.3–14.5)
SODIUM SERPL-SCNC: 137 MMOL/L — SIGNIFICANT CHANGE UP (ref 135–145)
WBC # BLD: 15.18 K/UL — HIGH (ref 3.8–10.5)
WBC # BLD: 17.01 K/UL — HIGH (ref 3.8–10.5)
WBC # FLD AUTO: 15.18 K/UL — HIGH (ref 3.8–10.5)
WBC # FLD AUTO: 17.01 K/UL — HIGH (ref 3.8–10.5)

## 2020-11-15 PROCEDURE — 71275 CT ANGIOGRAPHY CHEST: CPT | Mod: 26

## 2020-11-15 PROCEDURE — 99233 SBSQ HOSP IP/OBS HIGH 50: CPT | Mod: GC

## 2020-11-15 RX ORDER — SODIUM CHLORIDE 9 MG/ML
1000 INJECTION INTRAMUSCULAR; INTRAVENOUS; SUBCUTANEOUS ONCE
Refills: 0 | Status: COMPLETED | OUTPATIENT
Start: 2020-11-15 | End: 2020-11-15

## 2020-11-15 RX ORDER — ENOXAPARIN SODIUM 100 MG/ML
40 INJECTION SUBCUTANEOUS DAILY
Refills: 0 | Status: DISCONTINUED | OUTPATIENT
Start: 2020-11-15 | End: 2020-11-20

## 2020-11-15 RX ORDER — BENZOCAINE AND MENTHOL 5; 1 G/100ML; G/100ML
1 LIQUID ORAL EVERY 4 HOURS
Refills: 0 | Status: DISCONTINUED | OUTPATIENT
Start: 2020-11-15 | End: 2020-11-20

## 2020-11-15 RX ORDER — POTASSIUM CHLORIDE 20 MEQ
40 PACKET (EA) ORAL EVERY 6 HOURS
Refills: 0 | Status: COMPLETED | OUTPATIENT
Start: 2020-11-15 | End: 2020-11-15

## 2020-11-15 RX ORDER — ASCORBIC ACID 60 MG
500 TABLET,CHEWABLE ORAL DAILY
Refills: 0 | Status: DISCONTINUED | OUTPATIENT
Start: 2020-11-15 | End: 2020-11-20

## 2020-11-15 RX ORDER — SODIUM CHLORIDE 9 MG/ML
500 INJECTION INTRAMUSCULAR; INTRAVENOUS; SUBCUTANEOUS ONCE
Refills: 0 | Status: COMPLETED | OUTPATIENT
Start: 2020-11-15 | End: 2020-11-15

## 2020-11-15 RX ORDER — SENNA PLUS 8.6 MG/1
1 TABLET ORAL
Refills: 0 | Status: DISCONTINUED | OUTPATIENT
Start: 2020-11-15 | End: 2020-11-20

## 2020-11-15 RX ORDER — FERROUS SULFATE 325(65) MG
325 TABLET ORAL THREE TIMES A DAY
Refills: 0 | Status: DISCONTINUED | OUTPATIENT
Start: 2020-11-15 | End: 2020-11-20

## 2020-11-15 RX ADMIN — MORPHINE SULFATE 4 MILLIGRAM(S): 50 CAPSULE, EXTENDED RELEASE ORAL at 06:10

## 2020-11-15 RX ADMIN — Medication 15 MILLIGRAM(S): at 10:09

## 2020-11-15 RX ADMIN — Medication 100 GRAM(S): at 11:39

## 2020-11-15 RX ADMIN — MORPHINE SULFATE 4 MILLIGRAM(S): 50 CAPSULE, EXTENDED RELEASE ORAL at 01:22

## 2020-11-15 RX ADMIN — SODIUM CHLORIDE 500 MILLILITER(S): 9 INJECTION INTRAMUSCULAR; INTRAVENOUS; SUBCUTANEOUS at 19:57

## 2020-11-15 RX ADMIN — Medication 500 MILLIGRAM(S): at 11:38

## 2020-11-15 RX ADMIN — Medication 15 MILLIGRAM(S): at 03:43

## 2020-11-15 RX ADMIN — SODIUM CHLORIDE 125 MILLILITER(S): 9 INJECTION, SOLUTION INTRAVENOUS at 18:24

## 2020-11-15 RX ADMIN — Medication 15 MILLIGRAM(S): at 18:30

## 2020-11-15 RX ADMIN — Medication 325 MILLIGRAM(S): at 22:59

## 2020-11-15 RX ADMIN — Medication 110 MILLIGRAM(S): at 18:14

## 2020-11-15 RX ADMIN — ENOXAPARIN SODIUM 40 MILLIGRAM(S): 100 INJECTION SUBCUTANEOUS at 19:57

## 2020-11-15 RX ADMIN — MORPHINE SULFATE 4 MILLIGRAM(S): 50 CAPSULE, EXTENDED RELEASE ORAL at 01:42

## 2020-11-15 RX ADMIN — SODIUM CHLORIDE 1000 MILLILITER(S): 9 INJECTION INTRAMUSCULAR; INTRAVENOUS; SUBCUTANEOUS at 02:28

## 2020-11-15 RX ADMIN — MORPHINE SULFATE 4 MILLIGRAM(S): 50 CAPSULE, EXTENDED RELEASE ORAL at 23:18

## 2020-11-15 RX ADMIN — Medication 40 MILLIEQUIVALENT(S): at 18:15

## 2020-11-15 RX ADMIN — Medication 100 GRAM(S): at 23:01

## 2020-11-15 RX ADMIN — Medication 15 MILLIGRAM(S): at 03:12

## 2020-11-15 RX ADMIN — SIMETHICONE 80 MILLIGRAM(S): 80 TABLET, CHEWABLE ORAL at 13:52

## 2020-11-15 RX ADMIN — Medication 15 MILLIGRAM(S): at 10:25

## 2020-11-15 RX ADMIN — Medication 325 MILLIGRAM(S): at 13:50

## 2020-11-15 RX ADMIN — SODIUM CHLORIDE 3 MILLILITER(S): 9 INJECTION INTRAMUSCULAR; INTRAVENOUS; SUBCUTANEOUS at 13:50

## 2020-11-15 RX ADMIN — Medication 110 MILLIGRAM(S): at 05:42

## 2020-11-15 RX ADMIN — SIMETHICONE 80 MILLIGRAM(S): 80 TABLET, CHEWABLE ORAL at 18:14

## 2020-11-15 RX ADMIN — Medication 40 MILLIEQUIVALENT(S): at 11:38

## 2020-11-15 RX ADMIN — MORPHINE SULFATE 4 MILLIGRAM(S): 50 CAPSULE, EXTENDED RELEASE ORAL at 15:40

## 2020-11-15 RX ADMIN — MORPHINE SULFATE 4 MILLIGRAM(S): 50 CAPSULE, EXTENDED RELEASE ORAL at 05:43

## 2020-11-15 RX ADMIN — MONTELUKAST 10 MILLIGRAM(S): 4 TABLET, CHEWABLE ORAL at 23:00

## 2020-11-15 RX ADMIN — SIMETHICONE 80 MILLIGRAM(S): 80 TABLET, CHEWABLE ORAL at 22:59

## 2020-11-15 RX ADMIN — SIMETHICONE 80 MILLIGRAM(S): 80 TABLET, CHEWABLE ORAL at 10:08

## 2020-11-15 RX ADMIN — Medication 15 MILLIGRAM(S): at 18:15

## 2020-11-15 RX ADMIN — SENNA PLUS 1 TABLET(S): 8.6 TABLET ORAL at 18:15

## 2020-11-15 RX ADMIN — SODIUM CHLORIDE 3 MILLILITER(S): 9 INJECTION INTRAMUSCULAR; INTRAVENOUS; SUBCUTANEOUS at 23:00

## 2020-11-15 RX ADMIN — SODIUM CHLORIDE 3 MILLILITER(S): 9 INJECTION INTRAMUSCULAR; INTRAVENOUS; SUBCUTANEOUS at 05:20

## 2020-11-15 RX ADMIN — MORPHINE SULFATE 4 MILLIGRAM(S): 50 CAPSULE, EXTENDED RELEASE ORAL at 15:23

## 2020-11-15 NOTE — PROGRESS NOTE ADULT - PROBLEM SELECTOR PLAN 1
EKG showed sinus tachycardia  Pt is afebrile, WBC trending down  Possibly secondary to pain and anemia   Continue pain management  Monitor CBC  Monitor vitals  Continue Iv fluids EKG showed sinus tachycardia  Pt is afebrile, WBC trending down  Possibly secondary to pain and anemia   As the pt is persistently tachycardic, will do CT angio chest to rule out pulmonary embolism  Continue pain management  Monitor CBC  Monitor vitals  Continue Iv fluids

## 2020-11-15 NOTE — PROGRESS NOTE ADULT - PROBLEM SELECTOR PLAN 1
s/p LSO  gyn post-op care -no heavy lifting or pushing ; nothing in vagina -no tub baths, sex, or douching for 6weeks

## 2020-11-15 NOTE — CHART NOTE - NSCHARTNOTEFT_GEN_A_CORE
[facility]INELL[facility] Event Note     Pt without complaints; no chest pain, no n/v/d    T(C): 37.6 (11-14-20 @ 23:25), Max: 38.6 (11-14-20 @ 17:33)  HR: 113 (11-15-20 @ 01:26) (102 - 151)  BP: 128/83 (11-15-20 @ 01:26) (114/68 - 139/85)  RR: 20 (11-14-20 @ 23:25) (18 - 24)  SpO2: 97% (11-14-20 @ 23:25) (93% - 100%)  Wt(kg): --    Gen: A&Ox 3, NAD  Abdomen: +BS; Soft, nontender, ND; dressing c/d/i  Gyn: Min lochia  Ext: Nontender, DTRS 2+, no worsening edema        A/P: 34y year old Female s/p LSO  routine postop care  contine iv antibiotics  lupe

## 2020-11-15 NOTE — PROGRESS NOTE ADULT - SUBJECTIVE AND OBJECTIVE BOX
Patient seen with Dr. Wang at bedside resting comfortably offers no new complaints. + Ambulation, bueno in place. Denies HA, CP, SOB, N/V/D, no bm; dizziness, palpitations, worsening abdominal pain, vaginal bleeding, or any other concerns.   Vital Signs Last 24 Hrs  T(C): 37.2 (15 Nov 2020 05:26), Max: 38.6 (14 Nov 2020 17:33)  T(F): 98.9 (15 Nov 2020 05:26), Max: 101.5 (14 Nov 2020 17:33)  HR: 117 (15 Nov 2020 05:26) (113 - 151)  BP: 118/75 (15 Nov 2020 05:26) (118/75 - 139/85)  BP(mean): 93 (15 Nov 2020 01:26) (78 - 94)  RR: 19 (15 Nov 2020 05:26) (18 - 24)  SpO2: 100% (15 Nov 2020 05:26) (93% - 100%)    Gen: A&O x 3, NAD  Abdomen: soft; Nontender, nondistended, dressing C/D/I   Gyn: no lochia  Extremities: Nontender,  no worsening edema                        8.1    17.01 )-----------( 369      ( 15 Nov 2020 07:51 )             27.5       A/P: POD #1 s/p laparotomy LSO for TOA, improving leukocytosis  -Pain management as needed  -cont post op care  -adv diet to regular   -OOB and ambulate  -encourage insentive spirometer use

## 2020-11-15 NOTE — PROGRESS NOTE ADULT - PROBLEM SELECTOR PLAN 4
Pt mentioned that she has been taking albuterol as needed and montelukast 10 mg daily for asthma  No wheeze at this time   Continue albuterol as

## 2020-11-15 NOTE — PROGRESS NOTE ADULT - ASSESSMENT
Patient is a 34y old  Female who presents with a chief complaint of acute onset severe abdominal pain, vomiting, high suspicion for left TOA (14 Nov 2020 16:39)  ADmitted for L tuboovarian abscess and peritonitis.  S/P Exploratory laparotomy  yesterday  on nov 14th.   Medicine team was consulted for persistent tachycardia.

## 2020-11-15 NOTE — PROGRESS NOTE ADULT - ASSESSMENT
A/P: POD #1 s/p laparotomy LSO for TOA, improving leukocytosis  -Pain management as needed  -cont post op care  -adv diet to regular   -OOB and ambulate  -encourage insentive spirometer use

## 2020-11-15 NOTE — CHART NOTE - NSCHARTNOTEFT_GEN_A_CORE
Pt re-evaluated at bedside.   Ambulating. Denies CP, dizziness, SOB, palpitations, worsening abd pain or vb.  Tachycardia improved from a few hours ago. Pt had a EKG yesterday that showed sinus tachy.  Vital Signs Last 24 Hrs  T(C): 36.9 (15 Nov 2020 13:35), Max: 38.6 (14 Nov 2020 17:33)  T(F): 98.5 (15 Nov 2020 13:35), Max: 101.5 (14 Nov 2020 17:33)  HR: 117 (15 Nov 2020 13:35) (113 - 151)  BP: 115/82 (15 Nov 2020 13:35) (115/82 - 139/85)  BP(mean): 93 (15 Nov 2020 01:26) (78 - 94)  RR: 18 (15 Nov 2020 13:35) (18 - 24)  SpO2: 99% (15 Nov 2020 13:35) (93% - 100%)    Continue postop care.

## 2020-11-15 NOTE — CHART NOTE - NSCHARTNOTEFT_GEN_A_CORE
GYN PA Focused Note     Nurse alerted team of HR of 147  instructed to rpt cbc, orthostatic BP    Patient seen and reevaluated at bedside. Patient offers no complaints at this time, states she always has tachycardia due to asthma meds. Patient denies using albuterol pump today. Patient denies fever, chills, CP, SOB, palpitations, worsening abdominal pain, states she feels better than yesterday.    Vital Signs Last 24 Hrs  T(C): 37.1 (15 Nov 2020 17:30), Max: 38 (14 Nov 2020 21:51)  T(F): 98.7 (15 Nov 2020 17:30), Max: 100.4 (14 Nov 2020 21:51)  HR: 147 (15 Nov 2020 17:30) (113 - 147)  BP: 131/84 (15 Nov 2020 17:30) (115/82 - 136/85)  BP(mean): 93 (15 Nov 2020 01:26) (78 - 94)  RR: 18 (15 Nov 2020 17:30) (18 - 24)  SpO2: 99% (15 Nov 2020 17:30) (93% - 100%)    gen aox3, not in acute distress, non toxic appearing  pulm lungs are clear and equal b/l  cardiac tachycardia  abd BS x4, soft, nondistended, no guarding, no rebound, dressing clean and dry  gyn no VB  ext no calf tenderness, venodynes in place    f/u rpt cbc  orthostatic BP  continue incentive spirometry  f/u medicine recommendation  dw Dr. Kathleen ross attending

## 2020-11-15 NOTE — PROGRESS NOTE ADULT - PROBLEM SELECTOR PLAN 3
s/p Exploratory laparotomy on 11/14  POD 1   Continue iv doxycycline and iv cefotetan s/p Exploratory laparotomy and excision of abscess  on 11/14 to early AM 11/15.  POD 1   Continue iv doxycycline and iv cefotetan

## 2020-11-15 NOTE — CHART NOTE - NSCHARTNOTEFT_GEN_A_CORE
GYN PA Focused Note     orthostatic BP negative  h.h stable  afebrile                        7.9    15.18 )-----------( 385      ( 15 Nov 2020 18:15 )             26.7       medicine reconsult for tachycardia  spoke with Dr. Lloyd, will reevaluate patient, appreciated  dw Dr. Rimarachin house attending

## 2020-11-15 NOTE — PROGRESS NOTE ADULT - SUBJECTIVE AND OBJECTIVE BOX
PGY 3  Note discussed with Internal medicine attending    Patient is a 34y old  Female with PMH of Asthma on albuterol inhaler who  presented  with a chief complaint of acute onset severe abdominal pain, vomiting.  Pt was admitted for admitted for L tuboovarian abscess and peritonitis.  S/P Exploratory laparotomy  yesterday  on nov 14th.   Medicine was consulted yesterday for persistent tachycardia.  Pt is being treated with iv cefotetan and iv doxycycline for TOA,     INTERVAL HPI/OVERNIGHT EVENTS:  Pt continues to be tachycardic. Pt's HR went up to 147 today.  Pt denies lightheadedness, chest pain, shortness of breath and other complains at this time. Pt had no BM since 3 days. Denied nausea and vomiting.  Pt complained of mild abdominal pain at dressing site of about 3/10 intensity.       MEDICATIONS  (STANDING):  ascorbic acid 500 milliGRAM(s) Oral daily  cefoTEtan  IVPB      cefoTEtan  IVPB 2 Gram(s) IV Intermittent every 12 hours  doxycycline IVPB      doxycycline IVPB 100 milliGRAM(s) IV Intermittent every 12 hours  ferrous    sulfate 325 milliGRAM(s) Oral three times a day  lactated ringers. 1000 milliLiter(s) (125 mL/Hr) IV Continuous <Continuous>  montelukast 10 milliGRAM(s) Oral at bedtime  senna 1 Tablet(s) Oral two times a day  simethicone 80 milliGRAM(s) Chew every 4 hours  sodium chloride 0.9% lock flush 3 milliLiter(s) IV Push every 8 hours    MEDICATIONS  (PRN):  ketorolac   Injectable 15 milliGRAM(s) IV Push every 6 hours PRN Moderate Pain (4 - 6)  metoclopramide Injectable 10 milliGRAM(s) IV Push every 6 hours PRN Nausea and/or Vomiting  morphine  - Injectable 4 milliGRAM(s) IV Push every 4 hours PRN Severe Pain (7 - 10)  ondansetron Injectable 4 milliGRAM(s) IV Push every 6 hours PRN Nausea and/or Vomiting      __________________________________________________  REVIEW OF SYSTEMS:    CONSTITUTIONAL: No fever,   EYES: no acute visual disturbances  NECK: No pain or stiffness  RESPIRATORY: No cough; No shortness of breath  CARDIOVASCULAR: No chest pain, no palpitations  GASTROINTESTINAL:  Mild abdominal pain.   NEUROLOGICAL: No  lightheadedness, headache or numbness, no tremors  MUSCULOSKELETAL: No joint pain, no muscle pain  GENITOURINARY: no dysuria, no frequency, no hesitancy  PSYCHIATRY: no depression , no anxiety  ALL OTHER  ROS negative        Vital Signs Last 24 Hrs  T(C): 37.1 (15 Nov 2020 17:30), Max: 38 (14 Nov 2020 21:51)  T(F): 98.7 (15 Nov 2020 17:30), Max: 100.4 (14 Nov 2020 21:51)  HR: 147 (15 Nov 2020 17:30) (113 - 147)  BP: 131/84 (15 Nov 2020 17:30) (115/82 - 136/85)  BP(mean): 93 (15 Nov 2020 01:26) (78 - 94)  RR: 18 (15 Nov 2020 17:30) (18 - 24)  SpO2: 99% (15 Nov 2020 17:30) (93% - 100%)    ________________________________________________  PHYSICAL EXAM:  GENERAL: NAD, obese   HEENT: Normocephalic;  conjunctivae and sclerae clear; moist mucous membranes;   NECK : supple  CHEST/LUNG: Clear to auscultation bilaterally with good air entry   HEART: S1 S2  regular; no murmurs, gallops or rubs  ABDOMEN: Soft, Mild tenderness at wound site  EXTREMITIES: no cyanosis; no edema; no calf tenderness  SKIN: warm and dry; no rash  NERVOUS SYSTEM:  Awake and alert; Oriented  to place, person and time ; no new deficits    _________________________________________________  LABS:                        7.9    15.18 )-----------( 385      ( 15 Nov 2020 18:15 )             26.7     11-15    137  |  103  |  5<L>  ----------------------------<  88  3.3<L>   |  24  |  0.82    Ca    8.0<L>      15 Nov 2020 07:51    TPro  6.1  /  Alb  1.5<L>  /  TBili  0.5  /  DBili  x   /  AST  12  /  ALT  7<L>  /  AlkPhos  76  11-15    PT/INR - ( 14 Nov 2020 09:21 )   PT: 17.6 sec;   INR: 1.51 ratio         PTT - ( 14 Nov 2020 09:21 )  PTT:26.9 sec    CAPILLARY BLOOD GLUCOSE            RADIOLOGY & ADDITIONAL TESTS:    Imaging Personally Reviewed:  YES/NO    Consultant(s) Notes Reviewed:   YES/ No    Care Discussed with Consultants :     Plan of care was discussed with patient and /or primary care giver; all questions and concerns were addressed and care was aligned with patient's wishes.     PGY 3  Note discussed with Internal medicine attending    Patient is a 34y old  Female with PMH of Asthma on albuterol inhaler who  presented  with a chief complaint of acute onset severe abdominal pain, vomiting.  Pt was admitted for admitted for L tuboovarian abscess and peritonitis.  S/P Exploratory laparotomy  yesterday  on nov 14th. Large, left tubal abscess was excised.   Medicine was consulted yesterday for persistent tachycardia.  Pt is being treated with iv cefotetan and iv doxycycline for TOA,     INTERVAL HPI/OVERNIGHT EVENTS:  Pt continues to be tachycardic. Pt's HR went up to 147 today.  Pt denies lightheadedness, chest pain, shortness of breath and other complains at this time. Pt had no BM since 3 days. Denied nausea and vomiting.  Pt complained of mild abdominal pain at dressing site of about 3/10 intensity.       MEDICATIONS  (STANDING):  ascorbic acid 500 milliGRAM(s) Oral daily  cefoTEtan  IVPB      cefoTEtan  IVPB 2 Gram(s) IV Intermittent every 12 hours  doxycycline IVPB      doxycycline IVPB 100 milliGRAM(s) IV Intermittent every 12 hours  ferrous    sulfate 325 milliGRAM(s) Oral three times a day  lactated ringers. 1000 milliLiter(s) (125 mL/Hr) IV Continuous <Continuous>  montelukast 10 milliGRAM(s) Oral at bedtime  senna 1 Tablet(s) Oral two times a day  simethicone 80 milliGRAM(s) Chew every 4 hours  sodium chloride 0.9% lock flush 3 milliLiter(s) IV Push every 8 hours    MEDICATIONS  (PRN):  ketorolac   Injectable 15 milliGRAM(s) IV Push every 6 hours PRN Moderate Pain (4 - 6)  metoclopramide Injectable 10 milliGRAM(s) IV Push every 6 hours PRN Nausea and/or Vomiting  morphine  - Injectable 4 milliGRAM(s) IV Push every 4 hours PRN Severe Pain (7 - 10)  ondansetron Injectable 4 milliGRAM(s) IV Push every 6 hours PRN Nausea and/or Vomiting      __________________________________________________  REVIEW OF SYSTEMS:    CONSTITUTIONAL: No fever,   EYES: no acute visual disturbances  NECK: No pain or stiffness  RESPIRATORY: No cough; No shortness of breath  CARDIOVASCULAR: No chest pain, no palpitations  GASTROINTESTINAL:  Mild abdominal pain.   NEUROLOGICAL: No  lightheadedness, headache or numbness, no tremors  MUSCULOSKELETAL: No joint pain, no muscle pain  GENITOURINARY: no dysuria, no frequency, no hesitancy  PSYCHIATRY: no depression , no anxiety  ALL OTHER  ROS negative        Vital Signs Last 24 Hrs  T(C): 37.1 (15 Nov 2020 17:30), Max: 38 (14 Nov 2020 21:51)  T(F): 98.7 (15 Nov 2020 17:30), Max: 100.4 (14 Nov 2020 21:51)  HR: 147 (15 Nov 2020 17:30) (113 - 147)  BP: 131/84 (15 Nov 2020 17:30) (115/82 - 136/85)  BP(mean): 93 (15 Nov 2020 01:26) (78 - 94)  RR: 18 (15 Nov 2020 17:30) (18 - 24)  SpO2: 99% (15 Nov 2020 17:30) (93% - 100%)    ________________________________________________  PHYSICAL EXAM:  GENERAL: NAD, obese   HEENT: Normocephalic;  conjunctivae and sclerae clear; moist mucous membranes;   NECK : supple  CHEST/LUNG: Clear to auscultation bilaterally with good air entry   HEART: S1 S2  regular; no murmurs, gallops or rubs  ABDOMEN: Soft, Diffuse tenderness of the abdomen.   EXTREMITIES: no cyanosis; no edema; no calf tenderness  SKIN: warm and dry; no rash  NERVOUS SYSTEM:  Awake and alert; Oriented  to place, person and time ; no new deficits    _________________________________________________  LABS:                        7.9    15.18 )-----------( 385      ( 15 Nov 2020 18:15 )             26.7     11-15    137  |  103  |  5<L>  ----------------------------<  88  3.3<L>   |  24  |  0.82    Ca    8.0<L>      15 Nov 2020 07:51    TPro  6.1  /  Alb  1.5<L>  /  TBili  0.5  /  DBili  x   /  AST  12  /  ALT  7<L>  /  AlkPhos  76  11-15    PT/INR - ( 14 Nov 2020 09:21 )   PT: 17.6 sec;   INR: 1.51 ratio         PTT - ( 14 Nov 2020 09:21 )  PTT:26.9 sec                RADIOLOGY & ADDITIONAL TESTS:    Imaging Personally Reviewed:  YES/NO    Consultant(s) Notes Reviewed:   YES/ No    Care Discussed with Consultants :     Plan of care was discussed with patient and /or primary care giver; all questions and concerns were addressed and care was aligned with patient's wishes.

## 2020-11-16 DIAGNOSIS — K59.00 CONSTIPATION, UNSPECIFIED: ICD-10-CM

## 2020-11-16 DIAGNOSIS — Z29.9 ENCOUNTER FOR PROPHYLACTIC MEASURES, UNSPECIFIED: ICD-10-CM

## 2020-11-16 LAB
ANION GAP SERPL CALC-SCNC: 8 MMOL/L — SIGNIFICANT CHANGE UP (ref 5–17)
BUN SERPL-MCNC: 4 MG/DL — LOW (ref 7–18)
C TRACH RRNA SPEC QL NAA+PROBE: SIGNIFICANT CHANGE UP
CALCIUM SERPL-MCNC: 8.4 MG/DL — SIGNIFICANT CHANGE UP (ref 8.4–10.5)
CHLORIDE SERPL-SCNC: 103 MMOL/L — SIGNIFICANT CHANGE UP (ref 96–108)
CO2 SERPL-SCNC: 27 MMOL/L — SIGNIFICANT CHANGE UP (ref 22–31)
CREAT SERPL-MCNC: 0.81 MG/DL — SIGNIFICANT CHANGE UP (ref 0.5–1.3)
GLUCOSE SERPL-MCNC: 91 MG/DL — SIGNIFICANT CHANGE UP (ref 70–99)
HCT VFR BLD CALC: 27.2 % — LOW (ref 34.5–45)
HGB BLD-MCNC: 8.1 G/DL — LOW (ref 11.5–15.5)
MCHC RBC-ENTMCNC: 23.3 PG — LOW (ref 27–34)
MCHC RBC-ENTMCNC: 29.8 GM/DL — LOW (ref 32–36)
MCV RBC AUTO: 78.2 FL — LOW (ref 80–100)
NRBC # BLD: 0 /100 WBCS — SIGNIFICANT CHANGE UP (ref 0–0)
PLATELET # BLD AUTO: 425 K/UL — HIGH (ref 150–400)
POTASSIUM SERPL-MCNC: 3.8 MMOL/L — SIGNIFICANT CHANGE UP (ref 3.5–5.3)
POTASSIUM SERPL-SCNC: 3.8 MMOL/L — SIGNIFICANT CHANGE UP (ref 3.5–5.3)
RBC # BLD: 3.48 M/UL — LOW (ref 3.8–5.2)
RBC # FLD: 15.9 % — HIGH (ref 10.3–14.5)
SODIUM SERPL-SCNC: 138 MMOL/L — SIGNIFICANT CHANGE UP (ref 135–145)
SPECIMEN SOURCE: SIGNIFICANT CHANGE UP
WBC # BLD: 12.9 K/UL — HIGH (ref 3.8–10.5)
WBC # FLD AUTO: 12.9 K/UL — HIGH (ref 3.8–10.5)

## 2020-11-16 PROCEDURE — 99233 SBSQ HOSP IP/OBS HIGH 50: CPT | Mod: GC

## 2020-11-16 RX ORDER — POLYETHYLENE GLYCOL 3350 17 G/17G
17 POWDER, FOR SOLUTION ORAL DAILY
Refills: 0 | Status: DISCONTINUED | OUTPATIENT
Start: 2020-11-16 | End: 2020-11-20

## 2020-11-16 RX ORDER — IRON SUCROSE 20 MG/ML
200 INJECTION, SOLUTION INTRAVENOUS ONCE
Refills: 0 | Status: COMPLETED | OUTPATIENT
Start: 2020-11-16 | End: 2020-11-16

## 2020-11-16 RX ADMIN — MORPHINE SULFATE 4 MILLIGRAM(S): 50 CAPSULE, EXTENDED RELEASE ORAL at 16:52

## 2020-11-16 RX ADMIN — MORPHINE SULFATE 4 MILLIGRAM(S): 50 CAPSULE, EXTENDED RELEASE ORAL at 12:14

## 2020-11-16 RX ADMIN — MORPHINE SULFATE 4 MILLIGRAM(S): 50 CAPSULE, EXTENDED RELEASE ORAL at 06:07

## 2020-11-16 RX ADMIN — SENNA PLUS 1 TABLET(S): 8.6 TABLET ORAL at 05:09

## 2020-11-16 RX ADMIN — BENZOCAINE AND MENTHOL 1 LOZENGE: 5; 1 LIQUID ORAL at 16:36

## 2020-11-16 RX ADMIN — Medication 110 MILLIGRAM(S): at 17:40

## 2020-11-16 RX ADMIN — Medication 325 MILLIGRAM(S): at 22:03

## 2020-11-16 RX ADMIN — MORPHINE SULFATE 4 MILLIGRAM(S): 50 CAPSULE, EXTENDED RELEASE ORAL at 00:08

## 2020-11-16 RX ADMIN — MORPHINE SULFATE 4 MILLIGRAM(S): 50 CAPSULE, EXTENDED RELEASE ORAL at 07:55

## 2020-11-16 RX ADMIN — MORPHINE SULFATE 4 MILLIGRAM(S): 50 CAPSULE, EXTENDED RELEASE ORAL at 16:37

## 2020-11-16 RX ADMIN — MORPHINE SULFATE 4 MILLIGRAM(S): 50 CAPSULE, EXTENDED RELEASE ORAL at 22:33

## 2020-11-16 RX ADMIN — Medication 100 GRAM(S): at 11:55

## 2020-11-16 RX ADMIN — IRON SUCROSE 110 MILLIGRAM(S): 20 INJECTION, SOLUTION INTRAVENOUS at 11:55

## 2020-11-16 RX ADMIN — MORPHINE SULFATE 4 MILLIGRAM(S): 50 CAPSULE, EXTENDED RELEASE ORAL at 22:03

## 2020-11-16 RX ADMIN — ENOXAPARIN SODIUM 40 MILLIGRAM(S): 100 INJECTION SUBCUTANEOUS at 12:03

## 2020-11-16 RX ADMIN — Medication 325 MILLIGRAM(S): at 05:08

## 2020-11-16 RX ADMIN — SODIUM CHLORIDE 3 MILLILITER(S): 9 INJECTION INTRAMUSCULAR; INTRAVENOUS; SUBCUTANEOUS at 14:13

## 2020-11-16 RX ADMIN — MORPHINE SULFATE 4 MILLIGRAM(S): 50 CAPSULE, EXTENDED RELEASE ORAL at 11:59

## 2020-11-16 RX ADMIN — Medication 110 MILLIGRAM(S): at 05:08

## 2020-11-16 RX ADMIN — SIMETHICONE 80 MILLIGRAM(S): 80 TABLET, CHEWABLE ORAL at 22:03

## 2020-11-16 RX ADMIN — SENNA PLUS 1 TABLET(S): 8.6 TABLET ORAL at 17:41

## 2020-11-16 RX ADMIN — Medication 500 MILLIGRAM(S): at 12:03

## 2020-11-16 RX ADMIN — SODIUM CHLORIDE 3 MILLILITER(S): 9 INJECTION INTRAMUSCULAR; INTRAVENOUS; SUBCUTANEOUS at 05:09

## 2020-11-16 RX ADMIN — Medication 100 GRAM(S): at 23:56

## 2020-11-16 RX ADMIN — MONTELUKAST 10 MILLIGRAM(S): 4 TABLET, CHEWABLE ORAL at 22:03

## 2020-11-16 RX ADMIN — BENZOCAINE AND MENTHOL 1 LOZENGE: 5; 1 LIQUID ORAL at 04:12

## 2020-11-16 NOTE — PROGRESS NOTE ADULT - PROBLEM SELECTOR PLAN 4
Pt mentioned that she has been taking albuterol as needed and montelukast 10 mg.  will continue with montelukast.  Patient breathing comfortably on room air.

## 2020-11-16 NOTE — PROGRESS NOTE ADULT - SUBJECTIVE AND OBJECTIVE BOX
PGY 1 Note discussed with supervising resident and primary attending    Patient is a 34y old  Female who presents with a chief complaint of acute onset severe abdominal pain, vomiting, high suspicion for left TOA (16 Nov 2020 10:55)      INTERVAL HPI/OVERNIGHT EVENTS: Patient was seen and examined at bed side. Lissette is controlled with pain medications. Patient complained of constipation.    MEDICATIONS  (STANDING):  ascorbic acid 500 milliGRAM(s) Oral daily  cefoTEtan  IVPB      cefoTEtan  IVPB 2 Gram(s) IV Intermittent every 12 hours  doxycycline IVPB      doxycycline IVPB 100 milliGRAM(s) IV Intermittent every 12 hours  enoxaparin Injectable 40 milliGRAM(s) SubCutaneous daily  ferrous    sulfate 325 milliGRAM(s) Oral three times a day  montelukast 10 milliGRAM(s) Oral at bedtime  senna 1 Tablet(s) Oral two times a day  simethicone 80 milliGRAM(s) Chew every 4 hours  sodium chloride 0.9% lock flush 3 milliLiter(s) IV Push every 8 hours    MEDICATIONS  (PRN):  benzocaine 15 mG/menthol 3.6 mG (Sugar-Free) Lozenge 1 Lozenge Oral every 4 hours PRN Sore Throat  ketorolac   Injectable 15 milliGRAM(s) IV Push every 6 hours PRN Moderate Pain (4 - 6)  metoclopramide Injectable 10 milliGRAM(s) IV Push every 6 hours PRN Nausea and/or Vomiting  morphine  - Injectable 4 milliGRAM(s) IV Push every 4 hours PRN Severe Pain (7 - 10)  ondansetron Injectable 4 milliGRAM(s) IV Push every 6 hours PRN Nausea and/or Vomiting      __________________________________________________  REVIEW OF SYSTEMS:    CONSTITUTIONAL: No fever,   EYES: no acute visual disturbances  NECK: No pain or stiffness  RESPIRATORY: No cough; No shortness of breath  CARDIOVASCULAR: No chest pain, no palpitations  GASTROINTESTINAL: No pain. No nausea or vomiting; No diarrhea   NEUROLOGICAL: No headache or numbness, no tremors  MUSCULOSKELETAL: No joint pain, no muscle pain  GENITOURINARY: no dysuria, no frequency, no hesitancy  PSYCHIATRY: no depression , no anxiety  ALL OTHER  ROS negative        Vital Signs Last 24 Hrs  T(C): 37.6 (16 Nov 2020 14:08), Max: 37.7 (15 Nov 2020 22:54)  T(F): 99.6 (16 Nov 2020 14:08), Max: 99.9 (15 Nov 2020 22:54)  HR: 107 (16 Nov 2020 14:08) (102 - 135)  BP: 123/81 (16 Nov 2020 14:08) (123/76 - 141/85)  BP(mean): --  RR: 18 (16 Nov 2020 14:08) (17 - 18)  SpO2: 95% (16 Nov 2020 14:08) (95% - 100%)    ________________________________________________  PHYSICAL EXAM:  GENERAL: NAD  HEENT: Normocephalic;  conjunctivae and sclerae clear; moist mucous membranes;   NECK : supple  CHEST/LUNG: Clear to auscultation bilaterally with good air entry   HEART: S1 S2  regular; no murmurs, gallops or rubs  ABDOMEN: Soft, Nontender, Nondistended; Bowel sounds present  EXTREMITIES: no cyanosis; no edema; no calf tenderness  SKIN: warm and dry; no rash  NERVOUS SYSTEM:  Awake and alert; Oriented  to place, person and time ; no new deficits    _________________________________________________  LABS:                        8.1    12.90 )-----------( 425      ( 16 Nov 2020 10:14 )             27.2     11-16    138  |  103  |  4<L>  ----------------------------<  91  3.8   |  27  |  0.81    Ca    8.4      16 Nov 2020 10:14    TPro  6.1  /  Alb  1.5<L>  /  TBili  0.5  /  DBili  x   /  AST  12  /  ALT  7<L>  /  AlkPhos  76  11-15        CAPILLARY BLOOD GLUCOSE            RADIOLOGY & ADDITIONAL TESTS:    Imaging Personally Reviewed:  YES/NO    Consultant(s) Notes Reviewed:   YES/ No    Care Discussed with Consultants :     Plan of care was discussed with patient and /or primary care giver; all questions and concerns were addressed and care was aligned with patient's wishes.

## 2020-11-16 NOTE — PROGRESS NOTE ADULT - PROBLEM SELECTOR PLAN 5
Patient s complaining of constipation. can be due to pain meds.   Senna ordered.   can give miralax PRN for constipation

## 2020-11-16 NOTE — PROGRESS NOTE ADULT - PROBLEM SELECTOR PLAN 3
s/p Exploratory laparotomy and excision of abscess  on 11/14 to early AM 11/15.  Post OP day 2.  Continue iv doxycycline and iv cefotetan

## 2020-11-16 NOTE — PROGRESS NOTE ADULT - ASSESSMENT
A/P: 33yo POD#2 s/p LSO with removal of TOA, presently afebrile. Patient is on cefotetan and doxycycline. Cultures pending. Patient mildly tachycardic, repeat Hgb 8.1, c/w acute on chronic anemia. VSS.

## 2020-11-16 NOTE — PROGRESS NOTE ADULT - PROBLEM SELECTOR PLAN 1
-continue current abx regimen  -continue iron PO, will give one dose of venofer  -pain management prn  -plan to transition to PO meds  -follow up cultures  -consider dc home this PM or tomorrow AM  -patient understands and agrees with plan  -d/w Dr. Rich, house attending

## 2020-11-16 NOTE — PROGRESS NOTE ADULT - SUBJECTIVE AND OBJECTIVE BOX
GYN DAILY PROGRESS NOTE:    Patient seen at bedside resting comfortably offers no new complaints. C/o diffuse abdominal pain, patient thinks it is gas pain. She denies any chest pain, SOB, fever, chills, nausea, vomiting, or any other complaints.     Vital Signs Last 24 Hrs  T(C): 37.2 (16 Nov 2020 05:11), Max: 37.7 (15 Nov 2020 22:54)  T(F): 98.9 (16 Nov 2020 05:11), Max: 99.9 (15 Nov 2020 22:54)  HR: 102 (16 Nov 2020 05:11) (102 - 147)  BP: 123/76 (16 Nov 2020 05:11) (115/82 - 141/85)  BP(mean): --  RR: 17 (16 Nov 2020 05:11) (17 - 18)  SpO2: 100% (16 Nov 2020 05:11) (97% - 100%)    Gen: A&O x 3, NAD  Chest: CTA B/L  Cardiac: S1,S2, mildly tachycardic   Abdomen: +BS; softly distended, exam limited due to patient refusal of full palpation assessment, dressing in place C/D/I  Gyn: no vaginal bleeding   Extremities: Nontender, no worsening edema                          8.1    12.90 )-----------( 425      ( 16 Nov 2020 10:14 )             27.2     11-16    138  |  103  |  4<L>  ----------------------------<  91  3.8   |  27  |  0.81    Ca    8.4      16 Nov 2020 10:14    TPro  6.1  /  Alb  1.5<L>  /  TBili  0.5  /  DBili  x   /  AST  12  /  ALT  7<L>  /  AlkPhos  76  11-15

## 2020-11-16 NOTE — PROGRESS NOTE ADULT - PROBLEM SELECTOR PLAN 1
Medicine was consulted for tachycardia EKG showed sinus tachycardia. troponin was negative . Patients HR improved .  denies any chest pain, palpitations or dizziness.  Pt is afebrile, WBC trending down  Possibly secondary to pain and anemia   CTA was done was negative for any pulmonary embolism  Continue pain management   Patient started on diet. Medicine was consulted for tachycardia EKG showed sinus tachycardia. troponin was negative . Patients HR improved .  denies any chest pain, palpitations or dizziness.  Pt is afebrile, WBC trending down  Possibly secondary to pain and anemia   CTA was done was negative for any pulmonary embolism  Continue pain management  Follow Echo and TSH.

## 2020-11-17 DIAGNOSIS — Z98.890 OTHER SPECIFIED POSTPROCEDURAL STATES: ICD-10-CM

## 2020-11-17 LAB
FERRITIN SERPL-MCNC: 320 NG/ML — HIGH (ref 15–150)
GRAM STN FLD: SIGNIFICANT CHANGE UP
HCT VFR BLD CALC: 26.2 % — LOW (ref 34.5–45)
HGB BLD-MCNC: 7.8 G/DL — LOW (ref 11.5–15.5)
IRON SATN MFR SERPL: 36 % — SIGNIFICANT CHANGE UP (ref 15–50)
IRON SATN MFR SERPL: 58 UG/DL — SIGNIFICANT CHANGE UP (ref 40–150)
MCHC RBC-ENTMCNC: 22.9 PG — LOW (ref 27–34)
MCHC RBC-ENTMCNC: 29.8 GM/DL — LOW (ref 32–36)
MCV RBC AUTO: 77.1 FL — LOW (ref 80–100)
NRBC # BLD: 0 /100 WBCS — SIGNIFICANT CHANGE UP (ref 0–0)
PLATELET # BLD AUTO: 418 K/UL — HIGH (ref 150–400)
RBC # BLD: 3.4 M/UL — LOW (ref 3.8–5.2)
RBC # FLD: 15.9 % — HIGH (ref 10.3–14.5)
SPECIMEN SOURCE: SIGNIFICANT CHANGE UP
TIBC SERPL-MCNC: 159 UG/DL — LOW (ref 250–450)
TSH SERPL-MCNC: 1.24 UU/ML — SIGNIFICANT CHANGE UP (ref 0.34–4.82)
TSH SERPL-MCNC: 1.25 UU/ML — SIGNIFICANT CHANGE UP (ref 0.34–4.82)
UIBC SERPL-MCNC: 101 UG/DL — LOW (ref 110–370)
WBC # BLD: 11.02 K/UL — HIGH (ref 3.8–10.5)
WBC # FLD AUTO: 11.02 K/UL — HIGH (ref 3.8–10.5)

## 2020-11-17 PROCEDURE — 99231 SBSQ HOSP IP/OBS SF/LOW 25: CPT

## 2020-11-17 RX ORDER — CIPROFLOXACIN LACTATE 400MG/40ML
500 VIAL (ML) INTRAVENOUS EVERY 12 HOURS
Refills: 0 | Status: DISCONTINUED | OUTPATIENT
Start: 2020-11-17 | End: 2020-11-18

## 2020-11-17 RX ORDER — ONDANSETRON 8 MG/1
4 TABLET, FILM COATED ORAL EVERY 6 HOURS
Refills: 0 | Status: DISCONTINUED | OUTPATIENT
Start: 2020-11-17 | End: 2020-11-20

## 2020-11-17 RX ORDER — IBUPROFEN 200 MG
600 TABLET ORAL EVERY 6 HOURS
Refills: 0 | Status: DISCONTINUED | OUTPATIENT
Start: 2020-11-17 | End: 2020-11-20

## 2020-11-17 RX ORDER — ACETAMINOPHEN 500 MG
975 TABLET ORAL EVERY 6 HOURS
Refills: 0 | Status: DISCONTINUED | OUTPATIENT
Start: 2020-11-17 | End: 2020-11-20

## 2020-11-17 RX ORDER — METRONIDAZOLE 500 MG
500 TABLET ORAL EVERY 8 HOURS
Refills: 0 | Status: DISCONTINUED | OUTPATIENT
Start: 2020-11-17 | End: 2020-11-20

## 2020-11-17 RX ORDER — OXYCODONE HYDROCHLORIDE 5 MG/1
5 TABLET ORAL
Refills: 0 | Status: DISCONTINUED | OUTPATIENT
Start: 2020-11-17 | End: 2020-11-20

## 2020-11-17 RX ORDER — FAMOTIDINE 10 MG/ML
20 INJECTION INTRAVENOUS DAILY
Refills: 0 | Status: DISCONTINUED | OUTPATIENT
Start: 2020-11-17 | End: 2020-11-20

## 2020-11-17 RX ADMIN — Medication 600 MILLIGRAM(S): at 17:56

## 2020-11-17 RX ADMIN — Medication 500 MILLIGRAM(S): at 12:09

## 2020-11-17 RX ADMIN — SODIUM CHLORIDE 3 MILLILITER(S): 9 INJECTION INTRAMUSCULAR; INTRAVENOUS; SUBCUTANEOUS at 06:24

## 2020-11-17 RX ADMIN — Medication 110 MILLIGRAM(S): at 06:32

## 2020-11-17 RX ADMIN — OXYCODONE HYDROCHLORIDE 5 MILLIGRAM(S): 5 TABLET ORAL at 22:30

## 2020-11-17 RX ADMIN — Medication 500 MILLIGRAM(S): at 09:45

## 2020-11-17 RX ADMIN — Medication 500 MILLIGRAM(S): at 21:45

## 2020-11-17 RX ADMIN — Medication 600 MILLIGRAM(S): at 11:59

## 2020-11-17 RX ADMIN — FAMOTIDINE 20 MILLIGRAM(S): 10 INJECTION INTRAVENOUS at 12:09

## 2020-11-17 RX ADMIN — MONTELUKAST 10 MILLIGRAM(S): 4 TABLET, CHEWABLE ORAL at 21:45

## 2020-11-17 RX ADMIN — SIMETHICONE 80 MILLIGRAM(S): 80 TABLET, CHEWABLE ORAL at 03:48

## 2020-11-17 RX ADMIN — SIMETHICONE 80 MILLIGRAM(S): 80 TABLET, CHEWABLE ORAL at 06:33

## 2020-11-17 RX ADMIN — Medication 600 MILLIGRAM(S): at 12:30

## 2020-11-17 RX ADMIN — MORPHINE SULFATE 4 MILLIGRAM(S): 50 CAPSULE, EXTENDED RELEASE ORAL at 03:13

## 2020-11-17 RX ADMIN — MORPHINE SULFATE 4 MILLIGRAM(S): 50 CAPSULE, EXTENDED RELEASE ORAL at 02:43

## 2020-11-17 RX ADMIN — MORPHINE SULFATE 4 MILLIGRAM(S): 50 CAPSULE, EXTENDED RELEASE ORAL at 08:02

## 2020-11-17 RX ADMIN — OXYCODONE HYDROCHLORIDE 5 MILLIGRAM(S): 5 TABLET ORAL at 21:47

## 2020-11-17 RX ADMIN — Medication 500 MILLIGRAM(S): at 17:56

## 2020-11-17 RX ADMIN — ENOXAPARIN SODIUM 40 MILLIGRAM(S): 100 INJECTION SUBCUTANEOUS at 12:09

## 2020-11-17 RX ADMIN — SENNA PLUS 1 TABLET(S): 8.6 TABLET ORAL at 06:32

## 2020-11-17 NOTE — PROGRESS NOTE ADULT - SUBJECTIVE AND OBJECTIVE BOX
Patient seen at bedside resting comfortably offers no new complaints. + Ambulation, voiding without difficulty, + flatus; no bm; tolerating regular diet. Denies CP, SOB, N/V/D, dizziness, palpitations, vaginal bleeding.     Vital Signs Last 24 Hrs  T(C): 36.6 (17 Nov 2020 05:37), Max: 37.6 (16 Nov 2020 14:08)  T(F): 97.8 (17 Nov 2020 05:37), Max: 99.6 (16 Nov 2020 14:08)  HR: 108 (17 Nov 2020 05:37) (101 - 108)  BP: 133/96 (17 Nov 2020 05:37) (123/81 - 133/96)  BP(mean): --  RR: 18 (17 Nov 2020 05:37) (17 - 18)  SpO2: 100% (17 Nov 2020 05:37) (95% - 100%)    Gen: A&O x 3, NAD  Chest: CTA B/L  Cardiac: S1,S2  RRR  Abdomen: +BS; soft; Nontender, nondistended, incision C/D/I w staples in place  Gyn: no vaginal bleeding   Extremities: Nontender, no worsening edema                          7.8    11.02 )-----------( 418      ( 17 Nov 2020 07:46 )             26.2     11-16    138  |  103  |  4<L>  ----------------------------<  91  3.8   |  27  |  0.81    Ca    8.4      16 Nov 2020 10:14

## 2020-11-17 NOTE — PROGRESS NOTE ADULT - ASSESSMENT
35 yo F pt a/w L tuboovarian abscess and peritonitis, s/p exlap on 11/14, POD #3. Course c/b persistent tachycardia.    #Tachycardia: EKG showing sinus tach, likely in context of recent sepsis and surgery, improving from 120s-150s to low 100s overnight. TSH WNL, CTA showing no signs of PE. CBC stable.  -c/w abx  -f/u TTE results  -f/u PMD as outpt    CRICKET Goldberg MD  Internal Medicine  [phone] 33 yo F pt a/w L tuboovarian abscess and peritonitis, s/p exlap on 11/14, POD #3. Course c/b persistent tachycardia.    #Tachycardia: EKG showing sinus tach, likely in context of recent sepsis and surgery, improving from 120s-150s to low 100s overnight. TSH WNL, CTA showing no signs of PE. CBC stable. Albuterol and pain can also contribute to tachycardia.  -c/w abx  -f/u TTE results  -pain control  -f/u PMD as outpt    CRICKET Goldberg MD  Internal Medicine  [phone]

## 2020-11-17 NOTE — PROGRESS NOTE ADULT - PROBLEM SELECTOR PLAN 2
-cont pain management  -follow up TSH  -change to Cipro/flagyl PO if afebrile > 24hrs will d/c home   -oob, encourage ambulation  -lovenox DVT ppx  -f/u echo   -medicine on board for tachycardia  -d/w Dr Sam attending on call

## 2020-11-17 NOTE — PROGRESS NOTE ADULT - SUBJECTIVE AND OBJECTIVE BOX
Janelle Goldberg MD  Division of Hospital Medicine    24 HOUR EVENTS/ROS: No acute events overnight. Pt reports pain, bloating at surgical site. Denies chest pain, SOB, palpitations, dizziness.    MEDICATIONS:  enoxaparin Injectable 40 milliGRAM(s) SubCutaneous daily  ciprofloxacin     Tablet 500 milliGRAM(s) Oral every 12 hours  metroNIDAZOLE    Tablet 500 milliGRAM(s) Oral every 8 hours  montelukast 10 milliGRAM(s) Oral at bedtime  acetaminophen   Tablet .. 975 milliGRAM(s) Oral every 6 hours PRN  ibuprofen  Tablet. 600 milliGRAM(s) Oral every 6 hours PRN  ondansetron    Tablet 4 milliGRAM(s) Oral every 6 hours PRN  oxyCODONE    IR 5 milliGRAM(s) Oral every 3 hours PRN  famotidine    Tablet 20 milliGRAM(s) Oral daily  polyethylene glycol 3350 17 Gram(s) Oral daily PRN  senna 1 Tablet(s) Oral two times a day  simethicone 80 milliGRAM(s) Chew every 4 hours  ascorbic acid 500 milliGRAM(s) Oral daily  benzocaine 15 mG/menthol 3.6 mG (Sugar-Free) Lozenge 1 Lozenge Oral every 4 hours PRN  ferrous    sulfate 325 milliGRAM(s) Oral three times a day  sodium chloride 0.9% lock flush 3 milliLiter(s) IV Push every 8 hours    PHYSICAL EXAM:  T(C): 36.6 (11-17-20 @ 05:37), Max: 37.6 (11-16-20 @ 14:08)  HR: 108 (11-17-20 @ 05:37) (101 - 108)  BP: 133/96 (11-17-20 @ 05:37) (123/81 - 133/96)  RR: 18 (11-17-20 @ 05:37) (17 - 18)  SpO2: 100% (11-17-20 @ 05:37) (95% - 100%)  Wt(kg): --  Daily     Daily   I&O's Summary    Appearance: NAD	  HEENT:   Normal oral mucosa, PERRL, EOMI	  Lymphatic: No lymphadenopathy  Cardiovascular: Tachycardic, no murmurs  Respiratory: Lungs clear to auscultation	  Neuro/psych: Grossly non-focal, CN 2-12 intact, AAOX3, mood and affect normal  Gastrointestinal:  Soft, mild swelling over surgical site, C/D/I  Skin: No rashes, No ecchymoses, No cyanosis	  Extremities: Normal range of motion, No clubbing, cyanosis or edema  Vascular: Peripheral pulses palpable 2+ bilaterally    LABS: CBC shows microcytic anemia                        7.8    11.02 )-----------( 418      ( 17 Nov 2020 07:46 )             26.2     11-16    138  |  103  |  4<L>  ----------------------------<  91  3.8   |  27  |  0.81    Ca    8.4      16 Nov 2020 10:14      TTE: pending    CARDIAC MARKERS:  Troponin I, Serum: <0.015 ng/mL (11-14 @ 17:04)    	    ECG:  	SR  RADIOLOGY: CTA: no PE

## 2020-11-17 NOTE — PROGRESS NOTE ADULT - PROBLEM SELECTOR PLAN 1
Medicine was consulted for tachycardia EKG showed sinus tachycardia. troponin was negative . Patients HR improved .  denies any chest pain, palpitations or dizziness.  Pt is afebrile, WBC trending down  Possibly secondary to pain and anemia   CTA was done was negative for any pulmonary embolism  Continue pain management  Follow Echo and TSH.

## 2020-11-18 DIAGNOSIS — R11.0 NAUSEA: ICD-10-CM

## 2020-11-18 LAB
CULTURE RESULTS: SIGNIFICANT CHANGE UP
CULTURE RESULTS: SIGNIFICANT CHANGE UP
SPECIMEN SOURCE: SIGNIFICANT CHANGE UP
SPECIMEN SOURCE: SIGNIFICANT CHANGE UP

## 2020-11-18 PROCEDURE — 99233 SBSQ HOSP IP/OBS HIGH 50: CPT

## 2020-11-18 PROCEDURE — 74019 RADEX ABDOMEN 2 VIEWS: CPT | Mod: 26

## 2020-11-18 RX ORDER — SODIUM CHLORIDE 9 MG/ML
1000 INJECTION, SOLUTION INTRAVENOUS
Refills: 0 | Status: DISCONTINUED | OUTPATIENT
Start: 2020-11-18 | End: 2020-11-20

## 2020-11-18 RX ORDER — ONDANSETRON 8 MG/1
4 TABLET, FILM COATED ORAL EVERY 6 HOURS
Refills: 0 | Status: DISCONTINUED | OUTPATIENT
Start: 2020-11-18 | End: 2020-11-20

## 2020-11-18 RX ADMIN — ONDANSETRON 4 MILLIGRAM(S): 8 TABLET, FILM COATED ORAL at 14:49

## 2020-11-18 RX ADMIN — SODIUM CHLORIDE 3 MILLILITER(S): 9 INJECTION INTRAMUSCULAR; INTRAVENOUS; SUBCUTANEOUS at 15:27

## 2020-11-18 RX ADMIN — Medication 500 MILLIGRAM(S): at 11:22

## 2020-11-18 RX ADMIN — Medication 500 MILLIGRAM(S): at 21:38

## 2020-11-18 RX ADMIN — Medication 500 MILLIGRAM(S): at 05:09

## 2020-11-18 RX ADMIN — Medication 975 MILLIGRAM(S): at 03:11

## 2020-11-18 RX ADMIN — Medication 975 MILLIGRAM(S): at 21:00

## 2020-11-18 RX ADMIN — MONTELUKAST 10 MILLIGRAM(S): 4 TABLET, CHEWABLE ORAL at 21:38

## 2020-11-18 RX ADMIN — Medication 500 MILLIGRAM(S): at 14:49

## 2020-11-18 RX ADMIN — ONDANSETRON 4 MILLIGRAM(S): 8 TABLET, FILM COATED ORAL at 19:58

## 2020-11-18 RX ADMIN — FAMOTIDINE 20 MILLIGRAM(S): 10 INJECTION INTRAVENOUS at 11:22

## 2020-11-18 RX ADMIN — Medication 600 MILLIGRAM(S): at 10:32

## 2020-11-18 RX ADMIN — Medication 975 MILLIGRAM(S): at 19:56

## 2020-11-18 RX ADMIN — SODIUM CHLORIDE 3 MILLILITER(S): 9 INJECTION INTRAMUSCULAR; INTRAVENOUS; SUBCUTANEOUS at 21:37

## 2020-11-18 RX ADMIN — ENOXAPARIN SODIUM 40 MILLIGRAM(S): 100 INJECTION SUBCUTANEOUS at 11:22

## 2020-11-18 RX ADMIN — SODIUM CHLORIDE 125 MILLILITER(S): 9 INJECTION, SOLUTION INTRAVENOUS at 21:48

## 2020-11-18 RX ADMIN — Medication 975 MILLIGRAM(S): at 04:30

## 2020-11-18 NOTE — PROGRESS NOTE ADULT - ASSESSMENT
33 yo F pt a/w L tuboovarian abscess and peritonitis, s/p exlap on 11/14, POD #4 Course c/b persistent tachycardia, now resolved.    #Tachycardia: EKG showing sinus tach, likely in context of recent sepsis and surgery, improving from 120s-150s to SR now. TTE WNL.  -pain control  -f/u PMD as outpt  -medicine will sign off, please feel free to contact with further questions/concerns    CRICKET Goldberg MD  Internal Medicine  [phone]

## 2020-11-18 NOTE — PROGRESS NOTE ADULT - PROBLEM SELECTOR PLAN 2
/P: HD#6  POD # 4 s/p LSO for TOA; sepsis now with nausea; suspect illeus  abdomain x ray; npo 24hours  -cont pain management  -follow up cbc/bmp  -advance diet to regular after flatus  -oob, encourage ambulation  -lovenox DVT ppx  -d/w dr. francisco andrade

## 2020-11-18 NOTE — PROGRESS NOTE ADULT - ASSESSMENT
A/P: HD#6  POD # 4 s/p LSO for TOA; sepsis now with nausea; suspect illeus  abdomain x ray; npo 24hours  -cont pain management  -follow up cbc/bmp  -advance diet to regular after flatus  -oob, encourage ambulation  -lovenox DVT ppx  -d/w dr. francisco andrade

## 2020-11-18 NOTE — PROGRESS NOTE ADULT - SUBJECTIVE AND OBJECTIVE BOX
Janelle Goldberg MD  Division of Hospital Medicine    24 HOUR EVENTS/ROS: No acute events overnight. Pt reports nausea, pain at surgical site. No palpitations, chest pain, SOB.    PHYSICAL EXAM:  Vital Signs Last 24 Hrs  T(C): 36.8 (18 Nov 2020 14:32), Max: 36.9 (17 Nov 2020 20:18)  T(F): 98.3 (18 Nov 2020 14:32), Max: 98.4 (17 Nov 2020 20:18)  HR: 86 (18 Nov 2020 14:32) (80 - 86)  BP: 130/88 (18 Nov 2020 14:32) (130/88 - 142/80)  BP(mean): --  RR: 18 (18 Nov 2020 14:32) (18 - 18)  SpO2: 100% (18 Nov 2020 14:32) (98% - 100%)    Appearance: NAD	  HEENT:   Normal oral mucosa, PERRL, EOMI	  Lymphatic: No lymphadenopathy  Cardiovascular: RRR, no murmurs  Respiratory: Lungs clear to auscultation	  Neuro/psych: Grossly non-focal, CN 2-12 intact, AAOX3, mood and affect normal  Gastrointestinal:  Soft, mild swelling over surgical site, C/D/I  Skin: No rashes, No ecchymoses, No cyanosis	  Extremities: Normal range of motion, No clubbing, cyanosis or edema  Vascular: Peripheral pulses palpable 2+ bilaterally      TTE: no clinically remarkable findings

## 2020-11-19 ENCOUNTER — TRANSCRIPTION ENCOUNTER (OUTPATIENT)
Age: 35
End: 2020-11-19

## 2020-11-19 LAB
ANION GAP SERPL CALC-SCNC: 10 MMOL/L — SIGNIFICANT CHANGE UP (ref 5–17)
BUN SERPL-MCNC: 2 MG/DL — LOW (ref 7–18)
CALCIUM SERPL-MCNC: 8.4 MG/DL — SIGNIFICANT CHANGE UP (ref 8.4–10.5)
CHLORIDE SERPL-SCNC: 104 MMOL/L — SIGNIFICANT CHANGE UP (ref 96–108)
CO2 SERPL-SCNC: 25 MMOL/L — SIGNIFICANT CHANGE UP (ref 22–31)
CREAT SERPL-MCNC: 0.68 MG/DL — SIGNIFICANT CHANGE UP (ref 0.5–1.3)
GLUCOSE SERPL-MCNC: 115 MG/DL — HIGH (ref 70–99)
HCT VFR BLD CALC: 25.7 % — LOW (ref 34.5–45)
HGB BLD-MCNC: 7.8 G/DL — LOW (ref 11.5–15.5)
MCHC RBC-ENTMCNC: 23.4 PG — LOW (ref 27–34)
MCHC RBC-ENTMCNC: 30.4 GM/DL — LOW (ref 32–36)
MCV RBC AUTO: 76.9 FL — LOW (ref 80–100)
NRBC # BLD: 0 /100 WBCS — SIGNIFICANT CHANGE UP (ref 0–0)
PLATELET # BLD AUTO: 482 K/UL — HIGH (ref 150–400)
POTASSIUM SERPL-MCNC: 3.5 MMOL/L — SIGNIFICANT CHANGE UP (ref 3.5–5.3)
POTASSIUM SERPL-SCNC: 3.5 MMOL/L — SIGNIFICANT CHANGE UP (ref 3.5–5.3)
RBC # BLD: 3.34 M/UL — LOW (ref 3.8–5.2)
RBC # FLD: 16.3 % — HIGH (ref 10.3–14.5)
SODIUM SERPL-SCNC: 139 MMOL/L — SIGNIFICANT CHANGE UP (ref 135–145)
WBC # BLD: 9.69 K/UL — SIGNIFICANT CHANGE UP (ref 3.8–10.5)
WBC # FLD AUTO: 9.69 K/UL — SIGNIFICANT CHANGE UP (ref 3.8–10.5)

## 2020-11-19 RX ORDER — METRONIDAZOLE 500 MG
1 TABLET ORAL
Qty: 21 | Refills: 0
Start: 2020-11-19 | End: 2020-11-25

## 2020-11-19 RX ORDER — ALBUTEROL 90 UG/1
2.5 AEROSOL, METERED ORAL ONCE
Refills: 0 | Status: DISCONTINUED | OUTPATIENT
Start: 2020-11-19 | End: 2020-11-19

## 2020-11-19 RX ORDER — KETOROLAC TROMETHAMINE 30 MG/ML
1 SYRINGE (ML) INJECTION
Qty: 20 | Refills: 0
Start: 2020-11-19 | End: 2020-11-23

## 2020-11-19 RX ORDER — DOCUSATE SODIUM 100 MG
1 CAPSULE ORAL
Qty: 28 | Refills: 0
Start: 2020-11-19 | End: 2020-12-02

## 2020-11-19 RX ORDER — FERROUS SULFATE 325(65) MG
1 TABLET ORAL
Qty: 60 | Refills: 0
Start: 2020-11-19 | End: 2020-12-18

## 2020-11-19 RX ORDER — ALPRAZOLAM 0.25 MG
0.5 TABLET ORAL ONCE
Refills: 0 | Status: DISCONTINUED | OUTPATIENT
Start: 2020-11-19 | End: 2020-11-19

## 2020-11-19 RX ORDER — CIPROFLOXACIN LACTATE 400MG/40ML
1 VIAL (ML) INTRAVENOUS
Qty: 14 | Refills: 0
Start: 2020-11-19 | End: 2020-11-25

## 2020-11-19 RX ORDER — ALPRAZOLAM 0.25 MG
0.5 TABLET ORAL AT BEDTIME
Refills: 0 | Status: DISCONTINUED | OUTPATIENT
Start: 2020-11-19 | End: 2020-11-19

## 2020-11-19 RX ADMIN — Medication 0.5 MILLIGRAM(S): at 18:52

## 2020-11-19 RX ADMIN — ONDANSETRON 4 MILLIGRAM(S): 8 TABLET, FILM COATED ORAL at 17:13

## 2020-11-19 RX ADMIN — SIMETHICONE 80 MILLIGRAM(S): 80 TABLET, CHEWABLE ORAL at 17:14

## 2020-11-19 RX ADMIN — Medication 500 MILLIGRAM(S): at 21:47

## 2020-11-19 RX ADMIN — SIMETHICONE 80 MILLIGRAM(S): 80 TABLET, CHEWABLE ORAL at 12:54

## 2020-11-19 RX ADMIN — SODIUM CHLORIDE 3 MILLILITER(S): 9 INJECTION INTRAMUSCULAR; INTRAVENOUS; SUBCUTANEOUS at 05:17

## 2020-11-19 RX ADMIN — Medication 975 MILLIGRAM(S): at 04:00

## 2020-11-19 RX ADMIN — OXYCODONE HYDROCHLORIDE 5 MILLIGRAM(S): 5 TABLET ORAL at 13:45

## 2020-11-19 RX ADMIN — SIMETHICONE 80 MILLIGRAM(S): 80 TABLET, CHEWABLE ORAL at 05:18

## 2020-11-19 RX ADMIN — SODIUM CHLORIDE 3 MILLILITER(S): 9 INJECTION INTRAMUSCULAR; INTRAVENOUS; SUBCUTANEOUS at 14:53

## 2020-11-19 RX ADMIN — Medication 500 MILLIGRAM(S): at 13:09

## 2020-11-19 RX ADMIN — ENOXAPARIN SODIUM 40 MILLIGRAM(S): 100 INJECTION SUBCUTANEOUS at 12:57

## 2020-11-19 RX ADMIN — SIMETHICONE 80 MILLIGRAM(S): 80 TABLET, CHEWABLE ORAL at 21:47

## 2020-11-19 RX ADMIN — Medication 975 MILLIGRAM(S): at 05:00

## 2020-11-19 RX ADMIN — Medication 500 MILLIGRAM(S): at 12:54

## 2020-11-19 RX ADMIN — Medication 500 MILLIGRAM(S): at 05:18

## 2020-11-19 RX ADMIN — FAMOTIDINE 20 MILLIGRAM(S): 10 INJECTION INTRAVENOUS at 12:54

## 2020-11-19 RX ADMIN — OXYCODONE HYDROCHLORIDE 5 MILLIGRAM(S): 5 TABLET ORAL at 12:49

## 2020-11-19 RX ADMIN — MONTELUKAST 10 MILLIGRAM(S): 4 TABLET, CHEWABLE ORAL at 21:47

## 2020-11-19 NOTE — PROGRESS NOTE ADULT - SUBJECTIVE AND OBJECTIVE BOX
Patient seen at bedisde resting comfortably offers no new complaints. pt feels better; wants to eat; + Ambulation, voiding without difficulty, + flatus; tolerating regular diet. Denies HA, CP, SOB, N/V/D,  no bm; dizziness, palpitations, worsening abdominal pain, worsening vaginal bleeding, or any other concerns.     Vital Signs Last 24 Hrs  T(C): 36.9 (19 Nov 2020 05:25), Max: 36.9 (18 Nov 2020 20:56)  T(F): 98.5 (19 Nov 2020 05:25), Max: 98.5 (19 Nov 2020 05:25)  HR: 74 (19 Nov 2020 05:25) (74 - 86)  BP: 129/85 (19 Nov 2020 05:25) (128/92 - 130/88)  BP(mean): --  RR: 18 (19 Nov 2020 05:25) (17 - 18)  SpO2: 99% (19 Nov 2020 05:25) (99% - 100%)    Gen: A&O x 3, NAD  Chest: CTA B/L  Cardiac: S1,S2  RRR  Abdomen: +BS; soft; Nontender, nondistended staples c/d/i; staples removed steris placed  Gyn: no vaginal bleeding   Extremities: Nontender, no worsening edema                          7.8    9.69  )-----------( 482      ( 19 Nov 2020 08:04 )             25.7             A/P: Hd #7 POD # 5 s/p LSO; resolved sepsis   illeus ruled out  advance to regular   oob, encourage ambulation   discharge home today  -pt seen at bedside with dr.nilufar andrade Patient seen at bedisde resting comfortably offers no new complaints. pt feels better; wants to eat; + Ambulation, voiding without difficulty, + flatus; tolerating regular diet. Denies HA, CP, SOB, N/V/D,  no bm; dizziness, palpitations, worsening abdominal pain, worsening vaginal bleeding, or any other concerns.     Vital Signs Last 24 Hrs  T(C): 36.9 (19 Nov 2020 05:25), Max: 36.9 (18 Nov 2020 20:56)  T(F): 98.5 (19 Nov 2020 05:25), Max: 98.5 (19 Nov 2020 05:25)  HR: 74 (19 Nov 2020 05:25) (74 - 86)  BP: 129/85 (19 Nov 2020 05:25) (128/92 - 130/88)  BP(mean): --  RR: 18 (19 Nov 2020 05:25) (17 - 18)  SpO2: 99% (19 Nov 2020 05:25) (99% - 100%)    Gen: A&O x 3, NAD  Chest: CTA B/L  Cardiac: S1,S2  RRR  Abdomen: +BS; soft; Nontender, nondistended staples c/d/i; staples removed steris placed  Gyn: no vaginal bleeding   Extremities: Nontender, no worsening edema                          7.8    9.69  )-----------( 482      ( 19 Nov 2020 08:04 )             25.7             A/P: Hd #7 POD # 5 s/p LSO; resolved sepsis ; acute on chronic anemia asymptomatic  illeus ruled out  possibility of blood tranfusion discussed; however pt reports no dizzyness and or palpiations;   advance to regular   oob, encourage ambulation   discharge home today  -pt seen at bedside with dr.nilufar andrade

## 2020-11-19 NOTE — CHART NOTE - NSCHARTNOTEFT_GEN_A_CORE
pt seen bedside, seen by case management and physical therapy.  case management arranging transportation   physical therapy reports pt not attempting to do the stairs  will order social work for further evaluation; as pt is medically cleared for discharge   seen pt at bedside  [facility]

## 2020-11-19 NOTE — DISCHARGE NOTE PROVIDER - CARE PROVIDER_API CALL
Karishma Martino  OBSTETRICS AND GYNECOLOGY  [address] [place] [address]  Phone: [phone]  Fax: [phone]  Follow Up Time: 2 weeks

## 2020-11-19 NOTE — DISCHARGE NOTE PROVIDER - NSDCFUADDINST_GEN_ALL_CORE_FT
Pelvic rest for 5-6 weeks, nothing in vagina- no sex, no tampons. Avoid heavy lifting, no strenuous activities. Motrin as needed for pain. Regular diet as tolerated. Keep incision clean and dry. Shower only.   Follow up in office 2weeks.

## 2020-11-19 NOTE — DISCHARGE NOTE PROVIDER - NSDCMRMEDTOKEN_GEN_ALL_CORE_FT
Cipro 250 mg oral tablet: 1 tab(s) orally every 12 hours   Colace 100 mg oral capsule: 1 cap(s) orally 2 times a day   ferrous sulfate 325 mg (65 mg elemental iron) oral tablet: 1 tab(s) orally 2 times a day   ketorolac 10 mg oral tablet: 1 tab(s) orally 4 times a day   metroNIDAZOLE 500 mg oral tablet: 1 tab(s) orally every 8 hours  montelukast 10 mg oral tablet: 1 tab(s) orally once a day   Cipro 500 mg oral tablet: 1 tab(s) orally every 12 hours   docusate sodium 100 mg oral capsule: 1 cap(s) orally 2 times a day, As Needed -for constipation   FeroSul 325 mg (65 mg elemental iron) oral tablet: 1 tab(s) orally 3 times a day   Flagyl 500 mg oral tablet: 1 tab(s) orally 3 times a day   ketorolac 10 mg oral tablet: 1 tab(s) orally 4 times a day, As Needed -for moderate pain   montelukast 10 mg oral tablet: 1 tab(s) orally once a day

## 2020-11-19 NOTE — CHART NOTE - NSCHARTNOTEFT_GEN_A_CORE
LOLIS MARINELLI Event Note    pt reports asthma "acting up"  and reports that she needs to walk up 2 flights of stairs  no difficulty breathing, no chest pain no sob    T(C): 36.9 (11-19-20 @ 05:25), Max: 36.9 (11-18-20 @ 20:56)  HR: 74 (11-19-20 @ 05:25) (74 - 86)  BP: 129/85 (11-19-20 @ 05:25) (128/92 - 130/88)  RR: 18 (11-19-20 @ 05:25) (17 - 18)  SpO2: 99% (11-19-20 @ 05:25) (99% - 100%)  Wt(kg): --    Gen: A&Ox 3, NAD  Chest: CTA B/L mild basilar wheezing  Cardiac: S1+S2; RRR  Ext: Nontender, DTRS 2+, no worsening edema        A/P: 34y year old Female   albuetol  nebulizer not available at this time due to covid  but patient reports she has nebulizer at home and will do it at home  lupe LOLIS MARINELLI Event Note    pt reports asthma "acting up"  and reports that she needs to walk up 2 flights of stairs  no difficulty breathing, no chest pain no sob    T(C): 36.9 (11-19-20 @ 05:25), Max: 36.9 (11-18-20 @ 20:56)  HR: 74 (11-19-20 @ 05:25) (74 - 86)  BP: 129/85 (11-19-20 @ 05:25) (128/92 - 130/88)  RR: 18 (11-19-20 @ 05:25) (17 - 18)  SpO2: 99% (11-19-20 @ 05:25) (99% - 100%)  Wt(kg): --    Gen: A&Ox 3, NAD  Chest: CTA B/L   Cardiac: S1+S2; RRR  Ext: Nontender, DTRS 2+, no worsening edema        A/P: 34y year old Female   albuetol  nebulizer not available at this time due to covid  but patient reports she has nebulizer at home and will do it at home; MDI  case management  physical therapy  lupe OLLIS MARINELLI Event Note    pt reports asthma "acting up"  and reports that she needs to walk up 2 flights of stairs  no difficulty breathing, no chest pain no sob    pt tolerated regular diet, no n/v  T(C): 36.9 (11-19-20 @ 05:25), Max: 36.9 (11-18-20 @ 20:56)  HR: 74 (11-19-20 @ 05:25) (74 - 86)  BP: 129/85 (11-19-20 @ 05:25) (128/92 - 130/88)  RR: 18 (11-19-20 @ 05:25) (17 - 18)  SpO2: 99% (11-19-20 @ 05:25) (99% - 100%)  Wt(kg): --    Gen: A&Ox 3, NAD  Chest: CTA B/L   Cardiac: S1+S2; RRR  Ext: Nontender, DTRS 2+, no worsening edema        A/P: 34y year old Female   albuetol  nebulizer not available at this time due to covid  but patient reports she has nebulizer at home and will do it at home; MDI  case management  physical therapy  lupe LOLIS MARINELLI Event Note    pt reports asthma "acting up"  and reports that she needs to walk up 2 flights of stairs at home  no difficulty breathing, no chest pain no sob    pt tolerated regular diet, no n/v  T(C): 36.9 (11-19-20 @ 05:25), Max: 36.9 (11-18-20 @ 20:56)  HR: 74 (11-19-20 @ 05:25) (74 - 86)  BP: 129/85 (11-19-20 @ 05:25) (128/92 - 130/88)  RR: 18 (11-19-20 @ 05:25) (17 - 18)  SpO2: 99% (11-19-20 @ 05:25) (99% - 100%)  Wt(kg): --    Gen: A&Ox 3, NAD  Chest: CTA B/L   Cardiac: S1+S2; RRR  Ext: Nontender, DTRS 2+, no worsening edema        A/P: 34y year old Female   albuetol  nebulizer not available at this time due to covid  but patient reports she has nebulizer at home and will do it at home; MDI  case management  physical therapy  lupe

## 2020-11-19 NOTE — DISCHARGE NOTE PROVIDER - HOSPITAL COURSE
acute onset severe abdominal pain, vomiting, high suspicion for left TOA and sepsis  s/p LSO  s/p antibiotics

## 2020-11-19 NOTE — DISCHARGE NOTE PROVIDER - NSDCCPCAREPLAN_GEN_ALL_CORE_FT
PRINCIPAL DISCHARGE DIAGNOSIS  Diagnosis: Tubo-ovarian abscess  Assessment and Plan of Treatment: Tubo-ovarian abscess      SECONDARY DISCHARGE DIAGNOSES  Diagnosis: S/P exploratory laparotomy  Assessment and Plan of Treatment: S/P exploratory laparotomy

## 2020-11-19 NOTE — PROGRESS NOTE ADULT - ATTENDING COMMENTS
Patient was evaluated at bedside with  ISIS Gupta:    Incision - staples removed atraumatically, CDI wit steri-strips placed  Abdomen - Soft    Management reviewed with patient, counseled based on vitals/labs and physical exam, patient is cleared for discharge home today.  Patient was counseled for post-op follow up at [place] office.
Patient was examined at the bedside and discussed with Dr. Lloyd.     She is feeling better post-operatively.  Tachycardia persists, with measurements up to 147/minute.     Alert, cooperative woman, eating a meal  Vital Signs Last 24 Hrs  T(C): 37.5 (15 Nov 2020 19:27), Max: 38 (14 Nov 2020 21:51)  T(F): 99.5 (15 Nov 2020 19:27), Max: 100.4 (14 Nov 2020 21:51)  HR: 118 (15 Nov 2020 19:27) (113 - 147)  BP: 129/76 (15 Nov 2020 19:27) (115/82 - 136/82)  BP(mean): 93 (15 Nov 2020 01:26) (78 - 94)  RR: 17 (15 Nov 2020 19:27) (17 - 24)  SpO2: 100% (15 Nov 2020 19:27) (93% - 100%)  Lungs, clear  Cor, RRR  Abdomen, soft, bs WNL, tender  Neurological, intact                        7.9    15.18 )-----------( 385      ( 15 Nov 2020 18:15 )             26.7   11-15    137  |  103  |  5<L>  ----------------------------<  88  3.3<L>   |  24  |  0.82    Ca    8.0<L>      15 Nov 2020 07:51    TPro  6.1  /  Alb  1.5<L>  /  TBili  0.5  /  DBili  x   /  AST  12  /  ALT  7<L>  /  AlkPhos  76  11-15  INR: 1.51:      IMP:  Fever, tachycardia, likely secondary to infection with PID/TOA, now s/p removal of source.  Tachycardia is minimally improved. r/o PE,                consider cardiomyopathy          anemia, likely iron deficiency plus dilution, plus chronic disease.  Minimal blood loss at surgery.          hypokalemia is being replaced          Leukocytosis, improved since surgery.   Suggest:  iron studies:  replace, if deficient after resolution of the acute infection                Consider CT angio to exclude PE.  If negative, suggest transthoracic echocardiogram.
Patient was examined and discussed with Dr. Cummings.     She feels better today.   Vital Signs Last 24 Hrs  T(C): 36.6 (16 Nov 2020 21:58), Max: 37.7 (15 Nov 2020 22:54)  T(F): 97.9 (16 Nov 2020 21:58), Max: 99.9 (15 Nov 2020 22:54)  HR: 101 (16 Nov 2020 21:58) (101 - 135)  BP: 132/82 (16 Nov 2020 21:58) (123/76 - 141/85)  BP(mean): --  RR: 17 (16 Nov 2020 21:58) (17 - 18)  Lungs, clear  Cor, RRR  Abdomen, tender, bs WNL                        8.1    12.90 )-----------( 425      ( 16 Nov 2020 10:14 )             27.2   11-16    138  |  103  |  4<L>  ----------------------------<  91  3.8   |  27  |  0.81    Ca    8.4      16 Nov 2020 10:14    TPro  6.1  /  Alb  1.5<L>  /  TBili  0.5  /  DBili  x   /  AST  12  /  ALT  7<L>  /  AlkPhos  76  11-15  r< from: CT Angio Chest w/ IV Cont (11.15.20 @ 22:53) >    No pulmonary embolism.    New small bilateral pleural effusions and bibasilar compressive atelectasis.  New patchy nodular peribronchial vascular airspace opacities in the upper lobes and right lower lobe which may be reflective of a bronchiolitis and/or multifocal pneumonia.    < end of copied text >    IMP:  Tachycardia secondary to sepsis, improving after surgery.  r/o hyperthyroidism.           No evidence of PE, but multifocal pneumonia is seen on CT.  This is likely a hematogenous pneumonia from the septic process.  Source is         now controlled and patient is improving clinically.   Suggest:  Continue empirical antibiotics.  TSH.

## 2020-11-19 NOTE — PROGRESS NOTE ADULT - ASSESSMENT
A/P: Hd #7 POD # 5 s/p LSO; resolved sepsis   illeus ruled out  advance to regular   oob, encourage ambulation   discharge home today  -pt seen at bedside with dr.nilufar andrade

## 2020-11-20 ENCOUNTER — TRANSCRIPTION ENCOUNTER (OUTPATIENT)
Age: 35
End: 2020-11-20

## 2020-11-20 VITALS
HEART RATE: 71 BPM | OXYGEN SATURATION: 100 % | TEMPERATURE: 99 F | SYSTOLIC BLOOD PRESSURE: 116 MMHG | DIASTOLIC BLOOD PRESSURE: 79 MMHG | RESPIRATION RATE: 17 BRPM

## 2020-11-20 PROCEDURE — 85027 COMPLETE CBC AUTOMATED: CPT

## 2020-11-20 PROCEDURE — 86900 BLOOD TYPING SEROLOGIC ABO: CPT

## 2020-11-20 PROCEDURE — 87491 CHLMYD TRACH DNA AMP PROBE: CPT

## 2020-11-20 PROCEDURE — 36415 COLL VENOUS BLD VENIPUNCTURE: CPT

## 2020-11-20 PROCEDURE — 82728 ASSAY OF FERRITIN: CPT

## 2020-11-20 PROCEDURE — 84702 CHORIONIC GONADOTROPIN TEST: CPT

## 2020-11-20 PROCEDURE — 76830 TRANSVAGINAL US NON-OB: CPT

## 2020-11-20 PROCEDURE — 84484 ASSAY OF TROPONIN QUANT: CPT

## 2020-11-20 PROCEDURE — 99053 MED SERV 10PM-8AM 24 HR FAC: CPT

## 2020-11-20 PROCEDURE — 83550 IRON BINDING TEST: CPT

## 2020-11-20 PROCEDURE — 82553 CREATINE MB FRACTION: CPT

## 2020-11-20 PROCEDURE — 80053 COMPREHEN METABOLIC PANEL: CPT

## 2020-11-20 PROCEDURE — 85025 COMPLETE CBC W/AUTO DIFF WBC: CPT

## 2020-11-20 PROCEDURE — 74177 CT ABD & PELVIS W/CONTRAST: CPT

## 2020-11-20 PROCEDURE — 83690 ASSAY OF LIPASE: CPT

## 2020-11-20 PROCEDURE — 86769 SARS-COV-2 COVID-19 ANTIBODY: CPT

## 2020-11-20 PROCEDURE — 87205 SMEAR GRAM STAIN: CPT

## 2020-11-20 PROCEDURE — 82550 ASSAY OF CK (CPK): CPT

## 2020-11-20 PROCEDURE — 85610 PROTHROMBIN TIME: CPT

## 2020-11-20 PROCEDURE — 87075 CULTR BACTERIA EXCEPT BLOOD: CPT

## 2020-11-20 PROCEDURE — 86850 RBC ANTIBODY SCREEN: CPT

## 2020-11-20 PROCEDURE — 87070 CULTURE OTHR SPECIMN AEROBIC: CPT

## 2020-11-20 PROCEDURE — 87635 SARS-COV-2 COVID-19 AMP PRB: CPT

## 2020-11-20 PROCEDURE — 93306 TTE W/DOPPLER COMPLETE: CPT

## 2020-11-20 PROCEDURE — 86901 BLOOD TYPING SEROLOGIC RH(D): CPT

## 2020-11-20 PROCEDURE — 80048 BASIC METABOLIC PNL TOTAL CA: CPT

## 2020-11-20 PROCEDURE — 99285 EMERGENCY DEPT VISIT HI MDM: CPT | Mod: 25

## 2020-11-20 PROCEDURE — 74019 RADEX ABDOMEN 2 VIEWS: CPT

## 2020-11-20 PROCEDURE — 86923 COMPATIBILITY TEST ELECTRIC: CPT

## 2020-11-20 PROCEDURE — 84443 ASSAY THYROID STIM HORMONE: CPT

## 2020-11-20 PROCEDURE — 93005 ELECTROCARDIOGRAM TRACING: CPT

## 2020-11-20 PROCEDURE — 71275 CT ANGIOGRAPHY CHEST: CPT

## 2020-11-20 PROCEDURE — 85730 THROMBOPLASTIN TIME PARTIAL: CPT

## 2020-11-20 PROCEDURE — 83540 ASSAY OF IRON: CPT

## 2020-11-20 PROCEDURE — 81001 URINALYSIS AUTO W/SCOPE: CPT

## 2020-11-20 PROCEDURE — 87040 BLOOD CULTURE FOR BACTERIA: CPT

## 2020-11-20 PROCEDURE — 76856 US EXAM PELVIC COMPLETE: CPT

## 2020-11-20 PROCEDURE — 83605 ASSAY OF LACTIC ACID: CPT

## 2020-11-20 RX ORDER — METRONIDAZOLE 500 MG
1 TABLET ORAL
Qty: 30 | Refills: 0
Start: 2020-11-20 | End: 2020-11-29

## 2020-11-20 RX ORDER — DOCUSATE SODIUM 100 MG
1 CAPSULE ORAL
Qty: 10 | Refills: 0
Start: 2020-11-20 | End: 2020-11-24

## 2020-11-20 RX ORDER — MOXIFLOXACIN HYDROCHLORIDE TABLETS, 400 MG 400 MG/1
1 TABLET, FILM COATED ORAL
Qty: 20 | Refills: 0
Start: 2020-11-20 | End: 2020-11-29

## 2020-11-20 RX ORDER — FERROUS SULFATE 325(65) MG
1 TABLET ORAL
Qty: 90 | Refills: 0
Start: 2020-11-20 | End: 2020-12-19

## 2020-11-20 RX ORDER — KETOROLAC TROMETHAMINE 30 MG/ML
1 SYRINGE (ML) INJECTION
Qty: 20 | Refills: 0
Start: 2020-11-20 | End: 2020-11-24

## 2020-11-20 RX ORDER — MONTELUKAST 4 MG/1
1 TABLET, CHEWABLE ORAL
Qty: 0 | Refills: 0 | DISCHARGE

## 2020-11-20 RX ORDER — ACETAMINOPHEN 500 MG
975 TABLET ORAL EVERY 6 HOURS
Refills: 0 | Status: DISCONTINUED | OUTPATIENT
Start: 2020-11-20 | End: 2020-11-20

## 2020-11-20 RX ADMIN — Medication 975 MILLIGRAM(S): at 09:40

## 2020-11-20 RX ADMIN — SODIUM CHLORIDE 3 MILLILITER(S): 9 INJECTION INTRAMUSCULAR; INTRAVENOUS; SUBCUTANEOUS at 05:19

## 2020-11-20 RX ADMIN — Medication 500 MILLIGRAM(S): at 05:36

## 2020-11-20 RX ADMIN — Medication 975 MILLIGRAM(S): at 08:42

## 2020-11-20 NOTE — PROGRESS NOTE ADULT - REASON FOR ADMISSION
acute onset severe abdominal pain, vomiting, high suspicion for left TOA

## 2020-11-20 NOTE — PROGRESS NOTE ADULT - PROBLEM SELECTOR PLAN 3
acute on chronic anemia  pt asymptomatic  instructed to continue iron supplementation and iron fortified foods  follow up cbc postop visit 2 weeks

## 2020-11-20 NOTE — DISCHARGE NOTE NURSING/CASE MANAGEMENT/SOCIAL WORK - PATIENT PORTAL LINK FT
You can access the FollowMyHealth Patient Portal offered by [facility] by registering at the following website: [email or web address]. By joining [employer]’s FollowMyHealth portal, you will also be able to view your health information using other applications (apps) compatible with our system.

## 2020-11-20 NOTE — PROGRESS NOTE ADULT - PROBLEM SELECTOR PLAN 2
s/p exploratory laparotomy and  removal of left TOA  postop milestones met  encouraged OOB and continue pain mgmt  discharge instructions verbalized

## 2020-11-20 NOTE — PROGRESS NOTE ADULT - PROBLEM SELECTOR PLAN 1
removal of left TOA  postop milestones met  encouraged OOB and continue pain mgmt  discharge instructions verbalized

## 2020-11-20 NOTE — PROGRESS NOTE ADULT - SUBJECTIVE AND OBJECTIVE BOX
Patient evaluated at bedside, offers no acute complaints.  Tolerating regular diet, Voiding without difficulty, though using a bedside commode secondary to postop pain with ambulating; +flatus, +BM  Denies fever/chills, nausea/vomiting, headache, dizziness, chest pains, shortness of breath, palpitations    Vital Signs Last 24 Hrs  T(C): 36.8 (20 Nov 2020 05:10), Max: 36.8 (20 Nov 2020 05:10)  T(F): 98.2 (20 Nov 2020 05:10), Max: 98.2 (20 Nov 2020 05:10)  HR: 67 (20 Nov 2020 05:10) (67 - 92)  BP: 121/79 (20 Nov 2020 05:10) (121/79 - 147/90)  RR: 16 (20 Nov 2020 05:10) (16 - 17)  SpO2: 100% (20 Nov 2020 05:10) (100% - 100%)    PE: Pt appears comfortable, NAD, AAOx3  Chest: CTA bilaterally, no W/R/R  Cardiac: RRR  Abd: soft; NT; no guarding or rebound; +BS x4 quad; incision C/D/I with steristrips in place, no erythema, no wound drainage or bleeding, no swelling  Gyn: no vaginal bleeding  Ext: soft, NT; DTRs 2+ bilaterally; no worsening edema                          7.8    9.69  )-----------( 482      ( 19 Nov 2020 08:04 )             25.7     11-19    139  |  104  |  2<L>  ----------------------------<  115<H>  3.5   |  25  |  0.68    Ca    8.4      19 Nov 2020 08:04    d/w Dr. Wang

## 2020-11-22 LAB
CULTURE RESULTS: SIGNIFICANT CHANGE UP
SPECIMEN SOURCE: SIGNIFICANT CHANGE UP

## 2020-12-01 PROBLEM — Z00.00 ENCOUNTER FOR PREVENTIVE HEALTH EXAMINATION: Status: ACTIVE | Noted: 2020-12-01

## 2020-12-09 NOTE — PROGRESS NOTE ADULT - PROBLEM/PLAN-6
Ear Cerumen Removal    Date/Time: 12/8/2020 11:00 AM  Performed by: Neil Bowie MD  Authorized by: Neil Bowie MD     Consent Done?:  Yes (Verbal)  Location details:  Right ear  Procedure type: curette    Cerumen  Removal Results:  Cerumen completely removed  Patient tolerance:  Patient tolerated the procedure well with no immediate complications     Binocular microscopy used to examine ear canal and tympanic membrane.  There was excess skin debris on the left side with associated otitis externa and an impaction of cerumen on the right.  This was removed using a curette and other microinstrumentation. After removal TM and EAC were normal on the right side         DISPLAY PLAN FREE TEXT

## 2020-12-10 ENCOUNTER — OUTPATIENT (OUTPATIENT)
Dept: OUTPATIENT SERVICES | Facility: HOSPITAL | Age: 35
LOS: 1 days | End: 2020-12-10
Payer: MEDICAID

## 2020-12-10 ENCOUNTER — APPOINTMENT (OUTPATIENT)
Dept: OBGYN | Facility: CLINIC | Age: 35
End: 2020-12-10
Payer: MEDICAID

## 2020-12-10 ENCOUNTER — LABORATORY RESULT (OUTPATIENT)
Age: 35
End: 2020-12-10

## 2020-12-10 VITALS
SYSTOLIC BLOOD PRESSURE: 124 MMHG | WEIGHT: 150 LBS | DIASTOLIC BLOOD PRESSURE: 87 MMHG | HEIGHT: 59 IN | BODY MASS INDEX: 30.24 KG/M2 | TEMPERATURE: 97.7 F | OXYGEN SATURATION: 99 % | HEART RATE: 84 BPM

## 2020-12-10 DIAGNOSIS — Z83.3 FAMILY HISTORY OF DIABETES MELLITUS: ICD-10-CM

## 2020-12-10 DIAGNOSIS — Z78.9 OTHER SPECIFIED HEALTH STATUS: ICD-10-CM

## 2020-12-10 DIAGNOSIS — Z00.00 ENCOUNTER FOR GENERAL ADULT MEDICAL EXAMINATION WITHOUT ABNORMAL FINDINGS: ICD-10-CM

## 2020-12-10 PROCEDURE — ZZZZZ: CPT

## 2020-12-10 PROCEDURE — 99213 OFFICE O/P EST LOW 20 MIN: CPT

## 2020-12-10 NOTE — PHYSICAL EXAM
[Appropriately responsive] : appropriately responsive [Alert] : alert [No Acute Distress] : no acute distress [No Lymphadenopathy] : no lymphadenopathy [No Murmurs] : no murmurs [Soft] : soft [Non-tender] : non-tender [Non-distended] : non-distended [No HSM] : No HSM [No Lesions] : no lesions [No Mass] : no mass [Oriented x3] : oriented x3 [FreeTextEntry7] : Pfanestiel scar- healed

## 2020-12-10 NOTE — HISTORY OF PRESENT ILLNESS
[FreeTextEntry1] : \par  x1\par C/Sx1\par VTOP x 2\par \par LMP 20\par Last gyn exam/pap 2020 nl per pt.\par Was admitted to hosp for TOA -20, received laparotomy, LSO for TOA \par Hct 20 25.7\par Currently has no abd/pelvic pain. +Menstrual cramps

## 2020-12-11 PROCEDURE — G0463: CPT

## 2020-12-11 PROCEDURE — 85025 COMPLETE CBC W/AUTO DIFF WBC: CPT

## 2020-12-14 DIAGNOSIS — N70.93 SALPINGITIS AND OOPHORITIS, UNSPECIFIED: ICD-10-CM

## 2020-12-14 DIAGNOSIS — Z87.09 PERSONAL HISTORY OF OTHER DISEASES OF THE RESPIRATORY SYSTEM: ICD-10-CM

## 2020-12-14 LAB
BASOPHILS # BLD AUTO: 0.03 K/UL
BASOPHILS NFR BLD AUTO: 0.6 %
EOSINOPHIL # BLD AUTO: 0.23 K/UL
EOSINOPHIL NFR BLD AUTO: 4.4 %
HCT VFR BLD CALC: 34.3 %
HGB BLD-MCNC: 10.2 G/DL
IMM GRANULOCYTES NFR BLD AUTO: 0.4 %
LYMPHOCYTES # BLD AUTO: 2.11 K/UL
LYMPHOCYTES NFR BLD AUTO: 40.7 %
MAN DIFF?: NORMAL
MCHC RBC-ENTMCNC: 24.2 PG
MCHC RBC-ENTMCNC: 29.7 GM/DL
MCV RBC AUTO: 81.3 FL
MONOCYTES # BLD AUTO: 0.53 K/UL
MONOCYTES NFR BLD AUTO: 10.2 %
NEUTROPHILS # BLD AUTO: 2.26 K/UL
NEUTROPHILS NFR BLD AUTO: 43.7 %
PLATELET # BLD AUTO: 369 K/UL
RBC # BLD: 4.22 M/UL
RBC # FLD: 22.5 %
WBC # FLD AUTO: 5.18 K/UL

## 2020-12-28 ENCOUNTER — APPOINTMENT (OUTPATIENT)
Dept: OBGYN | Facility: CLINIC | Age: 35
End: 2020-12-28

## 2021-01-04 ENCOUNTER — OUTPATIENT (OUTPATIENT)
Dept: OUTPATIENT SERVICES | Facility: HOSPITAL | Age: 36
LOS: 1 days | End: 2021-01-04
Payer: MEDICAID

## 2021-01-04 ENCOUNTER — APPOINTMENT (OUTPATIENT)
Dept: OBGYN | Facility: CLINIC | Age: 36
End: 2021-01-04
Payer: MEDICAID

## 2021-01-04 VITALS
SYSTOLIC BLOOD PRESSURE: 126 MMHG | HEIGHT: 59 IN | DIASTOLIC BLOOD PRESSURE: 87 MMHG | OXYGEN SATURATION: 97 % | RESPIRATION RATE: 18 BRPM | WEIGHT: 155 LBS | HEART RATE: 86 BPM | TEMPERATURE: 98 F | BODY MASS INDEX: 31.25 KG/M2

## 2021-01-04 DIAGNOSIS — N70.93 SALPINGITIS AND OOPHORITIS, UNSPECIFIED: ICD-10-CM

## 2021-01-04 DIAGNOSIS — Z87.09 PERSONAL HISTORY OF OTHER DISEASES OF THE RESPIRATORY SYSTEM: ICD-10-CM

## 2021-01-04 DIAGNOSIS — Z00.00 ENCOUNTER FOR GENERAL ADULT MEDICAL EXAMINATION WITHOUT ABNORMAL FINDINGS: ICD-10-CM

## 2021-01-04 PROCEDURE — 87624 HPV HI-RISK TYP POOLED RSLT: CPT

## 2021-01-04 PROCEDURE — 87491 CHLMYD TRACH DNA AMP PROBE: CPT

## 2021-01-04 PROCEDURE — G0463: CPT

## 2021-01-04 PROCEDURE — 99213 OFFICE O/P EST LOW 20 MIN: CPT

## 2021-01-04 PROCEDURE — 87591 N.GONORRHOEAE DNA AMP PROB: CPT

## 2021-01-04 PROCEDURE — 87800 DETECT AGNT MULT DNA DIREC: CPT

## 2021-01-04 NOTE — HISTORY OF PRESENT ILLNESS
[Normal Amount/Duration] :  normal amount and duration [FreeTextEntry1] : 12/09/2020 [No] : Patient does not have concerns regarding sex [Previously active] : previously active [Men] : men [Vaginal] : vaginal

## 2021-01-04 NOTE — PHYSICAL EXAM
[Appropriately responsive] : appropriately responsive [Alert] : alert [No Acute Distress] : no acute distress [No Lymphadenopathy] : no lymphadenopathy [Regular Rate Rhythm] : regular rate rhythm [No Murmurs] : no murmurs [Clear to Auscultation B/L] : clear to auscultation bilaterally [Soft] : soft [Non-tender] : non-tender [Non-distended] : non-distended [No HSM] : No HSM [No Lesions] : no lesions [No Mass] : no mass [Oriented x3] : oriented x3 [Examination Of The Breasts] : a normal appearance [No Masses] : no breast masses were palpable [Labia Majora] : normal [Labia Minora] : normal [Discharge] : a  ~M vaginal discharge was present [Moderate] : moderate [White] : white [Thick] : thick [Normal] : normal [Uterine Adnexae] : normal

## 2021-01-05 ENCOUNTER — NON-APPOINTMENT (OUTPATIENT)
Age: 36
End: 2021-01-05

## 2021-01-05 DIAGNOSIS — Z01.419 ENCOUNTER FOR GYNECOLOGICAL EXAMINATION (GENERAL) (ROUTINE) WITHOUT ABNORMAL FINDINGS: ICD-10-CM

## 2021-01-05 DIAGNOSIS — Z12.39 ENCOUNTER FOR OTHER SCREENING FOR MALIGNANT NEOPLASM OF BREAST: ICD-10-CM

## 2021-01-05 DIAGNOSIS — N76.1 SUBACUTE AND CHRONIC VAGINITIS: ICD-10-CM

## 2021-01-05 DIAGNOSIS — Z31.69 ENCOUNTER FOR OTHER GENERAL COUNSELING AND ADVICE ON PROCREATION: ICD-10-CM

## 2021-01-05 DIAGNOSIS — Z11.3 ENCOUNTER FOR SCREENING FOR INFECTIONS WITH A PREDOMINANTLY SEXUAL MODE OF TRANSMISSION: ICD-10-CM

## 2021-01-05 DIAGNOSIS — Z87.09 PERSONAL HISTORY OF OTHER DISEASES OF THE RESPIRATORY SYSTEM: ICD-10-CM

## 2021-01-05 LAB
C TRACH RRNA SPEC QL NAA+PROBE: NOT DETECTED
CANDIDA VAG CYTO: DETECTED
G VAGINALIS+PREV SP MTYP VAG QL MICRO: NOT DETECTED
HPV HIGH+LOW RISK DNA PNL CVX: NOT DETECTED
N GONORRHOEA RRNA SPEC QL NAA+PROBE: NOT DETECTED
SOURCE TP AMPLIFICATION: NORMAL
T VAGINALIS VAG QL WET PREP: NOT DETECTED

## 2021-01-07 LAB — CYTOLOGY CVX/VAG DOC THIN PREP: ABNORMAL

## 2021-07-23 ENCOUNTER — TRANSCRIPTION ENCOUNTER (OUTPATIENT)
Age: 36
End: 2021-07-23

## 2021-07-28 ENCOUNTER — APPOINTMENT (OUTPATIENT)
Dept: OBGYN | Facility: CLINIC | Age: 36
End: 2021-07-28

## 2021-08-03 ENCOUNTER — OUTPATIENT (OUTPATIENT)
Dept: OUTPATIENT SERVICES | Facility: HOSPITAL | Age: 36
LOS: 1 days | End: 2021-08-03
Payer: MEDICAID

## 2021-08-03 ENCOUNTER — APPOINTMENT (OUTPATIENT)
Dept: OBGYN | Facility: CLINIC | Age: 36
End: 2021-08-03
Payer: MEDICAID

## 2021-08-03 VITALS
WEIGHT: 158 LBS | OXYGEN SATURATION: 100 % | BODY MASS INDEX: 36.57 KG/M2 | DIASTOLIC BLOOD PRESSURE: 86 MMHG | RESPIRATION RATE: 18 BRPM | HEIGHT: 55 IN | HEART RATE: 79 BPM | SYSTOLIC BLOOD PRESSURE: 136 MMHG

## 2021-08-03 DIAGNOSIS — Z11.3 ENCOUNTER FOR SCREENING FOR INFECTIONS WITH A PREDOMINANTLY SEXUAL MODE OF TRANSMISSION: ICD-10-CM

## 2021-08-03 DIAGNOSIS — Z01.419 ENCOUNTER FOR GYNECOLOGICAL EXAMINATION (GENERAL) (ROUTINE) W/OUT ABNORMAL FINDINGS: ICD-10-CM

## 2021-08-03 DIAGNOSIS — N76.1 SUBACUTE AND CHRONIC VAGINITIS: ICD-10-CM

## 2021-08-03 DIAGNOSIS — Z12.39 ENCOUNTER FOR OTHER SCREENING FOR MALIGNANT NEOPLASM OF BREAST: ICD-10-CM

## 2021-08-03 DIAGNOSIS — Z31.69 ENCOUNTER FOR OTHER GENERAL COUNSELING AND ADVICE ON PROCREATION: ICD-10-CM

## 2021-08-03 DIAGNOSIS — Z34.00 ENCOUNTER FOR SUPERVISION OF NORMAL FIRST PREGNANCY, UNSPECIFIED TRIMESTER: ICD-10-CM

## 2021-08-03 PROCEDURE — G0463: CPT

## 2021-08-03 PROCEDURE — 99213 OFFICE O/P EST LOW 20 MIN: CPT

## 2021-08-03 RX ORDER — FLUCONAZOLE 150 MG/1
150 TABLET ORAL
Qty: 2 | Refills: 1 | Status: COMPLETED | COMMUNITY
Start: 2021-01-05 | End: 2021-08-03

## 2021-08-03 RX ORDER — CALCIUM CARBONATE 300MG(750)
0.4-32.5 TABLET,CHEWABLE ORAL
Qty: 30 | Refills: 11 | Status: ACTIVE | COMMUNITY
Start: 2021-08-03 | End: 1900-01-01

## 2021-08-03 NOTE — HISTORY OF PRESENT ILLNESS
[Patient reported PAP Smear was normal] : Patient reported PAP Smear was normal [N] : Patient does not use contraception [Y] : Positive pregnancy history [FreeTextEntry1] : Patient is here for STD testing and fertility counseling. \par \par Patient reports having L oophorectomy (11/14/ 2020) due to Left ovarian abscess.\par \par LMP(07/28/21) q 28 days, sexually active, concerned she has been trying to conceive x 3 months and has not yet conceived.  Patient reassured, advised if cycles are q 28 days, then she is ovulating.  Advised to schedule initial primary care visit with PCP for preventive care, she has not had an evaluation with PCP in the past.\par \par Patient has hx/o Asthma, last exacerbation > 2 years ago, obtains care at Urgent care, never seen a Pulmonologist for consultation, currently declines due to lack of symptoms.  Has not obtained the COVID vaccine, counseled on patient's risks, complications of COVID infection, encouraged to obtain vaccine, patient is undecided.  [PapSmeardate] : 1/14/21 [LMPDate] : 7/28/21 [PGHxTotal] : 3 [[facility] InstitutexFullTerm] : 2 [PGHxPremature] : 0 [PGHxAbortions] : 1 [[facility] CenterxLiving] : 2 [PGHxABInduced] : 1 [PGHxABSpont] : 0 [PGHxEctopic] : 0 [PGHxMultBirths] : 0

## 2021-08-05 DIAGNOSIS — Z11.3 ENCOUNTER FOR SCREENING FOR INFECTIONS WITH A PREDOMINANTLY SEXUAL MODE OF TRANSMISSION: ICD-10-CM

## 2021-08-05 DIAGNOSIS — Z31.69 ENCOUNTER FOR OTHER GENERAL COUNSELING AND ADVICE ON PROCREATION: ICD-10-CM

## 2021-08-05 LAB
C TRACH RRNA SPEC QL NAA+PROBE: NOT DETECTED
N GONORRHOEA RRNA SPEC QL NAA+PROBE: NOT DETECTED
SOURCE AMPLIFICATION: NORMAL

## 2021-12-05 ENCOUNTER — EMERGENCY (EMERGENCY)
Facility: HOSPITAL | Age: 36
LOS: 1 days | Discharge: HOME | End: 2021-12-05
Attending: STUDENT IN AN ORGANIZED HEALTH CARE EDUCATION/TRAINING PROGRAM
Payer: MEDICAID

## 2021-12-05 VITALS
HEART RATE: 70 BPM | TEMPERATURE: 98 F | WEIGHT: 160.72 LBS | HEIGHT: 66 IN | DIASTOLIC BLOOD PRESSURE: 93 MMHG | OXYGEN SATURATION: 95 % | SYSTOLIC BLOOD PRESSURE: 148 MMHG | RESPIRATION RATE: 18 BRPM

## 2021-12-05 VITALS
TEMPERATURE: 98 F | HEART RATE: 70 BPM | SYSTOLIC BLOOD PRESSURE: 134 MMHG | DIASTOLIC BLOOD PRESSURE: 87 MMHG | OXYGEN SATURATION: 99 % | RESPIRATION RATE: 20 BRPM

## 2021-12-05 LAB
ALBUMIN SERPL ELPH-MCNC: 3.2 G/DL — LOW (ref 3.5–5)
ALP SERPL-CCNC: 61 U/L — SIGNIFICANT CHANGE UP (ref 40–120)
ALT FLD-CCNC: 21 U/L DA — SIGNIFICANT CHANGE UP (ref 10–60)
ANION GAP SERPL CALC-SCNC: 5 MMOL/L — SIGNIFICANT CHANGE UP (ref 5–17)
APPEARANCE UR: CLEAR — SIGNIFICANT CHANGE UP
AST SERPL-CCNC: 14 U/L — SIGNIFICANT CHANGE UP (ref 10–40)
BACTERIA # UR AUTO: ABNORMAL /HPF
BASOPHILS # BLD AUTO: 0.04 K/UL — SIGNIFICANT CHANGE UP (ref 0–0.2)
BASOPHILS NFR BLD AUTO: 0.6 % — SIGNIFICANT CHANGE UP (ref 0–2)
BILIRUB SERPL-MCNC: 0.2 MG/DL — SIGNIFICANT CHANGE UP (ref 0.2–1.2)
BILIRUB UR-MCNC: NEGATIVE — SIGNIFICANT CHANGE UP
BUN SERPL-MCNC: 12 MG/DL — SIGNIFICANT CHANGE UP (ref 7–18)
CALCIUM SERPL-MCNC: 8.4 MG/DL — SIGNIFICANT CHANGE UP (ref 8.4–10.5)
CHLORIDE SERPL-SCNC: 108 MMOL/L — SIGNIFICANT CHANGE UP (ref 96–108)
CO2 SERPL-SCNC: 25 MMOL/L — SIGNIFICANT CHANGE UP (ref 22–31)
COLOR SPEC: YELLOW — SIGNIFICANT CHANGE UP
COMMENT - URINE: SIGNIFICANT CHANGE UP
CREAT SERPL-MCNC: 0.89 MG/DL — SIGNIFICANT CHANGE UP (ref 0.5–1.3)
DIFF PNL FLD: ABNORMAL
EOSINOPHIL # BLD AUTO: 0.32 K/UL — SIGNIFICANT CHANGE UP (ref 0–0.5)
EOSINOPHIL NFR BLD AUTO: 4.7 % — SIGNIFICANT CHANGE UP (ref 0–6)
EPI CELLS # UR: ABNORMAL /HPF
GLUCOSE SERPL-MCNC: 95 MG/DL — SIGNIFICANT CHANGE UP (ref 70–99)
GLUCOSE UR QL: NEGATIVE — SIGNIFICANT CHANGE UP
HCG SERPL-ACNC: <1 MIU/ML — SIGNIFICANT CHANGE UP
HCT VFR BLD CALC: 36.2 % — SIGNIFICANT CHANGE UP (ref 34.5–45)
HGB BLD-MCNC: 11.4 G/DL — LOW (ref 11.5–15.5)
IMM GRANULOCYTES NFR BLD AUTO: 0.3 % — SIGNIFICANT CHANGE UP (ref 0–1.5)
KETONES UR-MCNC: NEGATIVE — SIGNIFICANT CHANGE UP
LEUKOCYTE ESTERASE UR-ACNC: ABNORMAL
LIDOCAIN IGE QN: 132 U/L — SIGNIFICANT CHANGE UP (ref 73–393)
LYMPHOCYTES # BLD AUTO: 2.68 K/UL — SIGNIFICANT CHANGE UP (ref 1–3.3)
LYMPHOCYTES # BLD AUTO: 39.2 % — SIGNIFICANT CHANGE UP (ref 13–44)
MCHC RBC-ENTMCNC: 27.1 PG — SIGNIFICANT CHANGE UP (ref 27–34)
MCHC RBC-ENTMCNC: 31.5 GM/DL — LOW (ref 32–36)
MCV RBC AUTO: 86.2 FL — SIGNIFICANT CHANGE UP (ref 80–100)
MONOCYTES # BLD AUTO: 0.73 K/UL — SIGNIFICANT CHANGE UP (ref 0–0.9)
MONOCYTES NFR BLD AUTO: 10.7 % — SIGNIFICANT CHANGE UP (ref 2–14)
NEUTROPHILS # BLD AUTO: 3.05 K/UL — SIGNIFICANT CHANGE UP (ref 1.8–7.4)
NEUTROPHILS NFR BLD AUTO: 44.5 % — SIGNIFICANT CHANGE UP (ref 43–77)
NITRITE UR-MCNC: NEGATIVE — SIGNIFICANT CHANGE UP
NRBC # BLD: 0 /100 WBCS — SIGNIFICANT CHANGE UP (ref 0–0)
PH UR: 6.5 — SIGNIFICANT CHANGE UP (ref 5–8)
PLATELET # BLD AUTO: 277 K/UL — SIGNIFICANT CHANGE UP (ref 150–400)
POTASSIUM SERPL-MCNC: 3.8 MMOL/L — SIGNIFICANT CHANGE UP (ref 3.5–5.3)
POTASSIUM SERPL-SCNC: 3.8 MMOL/L — SIGNIFICANT CHANGE UP (ref 3.5–5.3)
PROT SERPL-MCNC: 7.1 G/DL — SIGNIFICANT CHANGE UP (ref 6–8.3)
PROT UR-MCNC: 15
RBC # BLD: 4.2 M/UL — SIGNIFICANT CHANGE UP (ref 3.8–5.2)
RBC # FLD: 14.2 % — SIGNIFICANT CHANGE UP (ref 10.3–14.5)
RBC CASTS # UR COMP ASSIST: SIGNIFICANT CHANGE UP /HPF (ref 0–2)
SODIUM SERPL-SCNC: 138 MMOL/L — SIGNIFICANT CHANGE UP (ref 135–145)
SP GR SPEC: 1.01 — SIGNIFICANT CHANGE UP (ref 1.01–1.02)
UROBILINOGEN FLD QL: NEGATIVE — SIGNIFICANT CHANGE UP
WBC # BLD: 6.84 K/UL — SIGNIFICANT CHANGE UP (ref 3.8–10.5)
WBC # FLD AUTO: 6.84 K/UL — SIGNIFICANT CHANGE UP (ref 3.8–10.5)
WBC UR QL: ABNORMAL /HPF (ref 0–5)

## 2021-12-05 PROCEDURE — 36415 COLL VENOUS BLD VENIPUNCTURE: CPT

## 2021-12-05 PROCEDURE — 81001 URINALYSIS AUTO W/SCOPE: CPT

## 2021-12-05 PROCEDURE — 87086 URINE CULTURE/COLONY COUNT: CPT

## 2021-12-05 PROCEDURE — 82962 GLUCOSE BLOOD TEST: CPT

## 2021-12-05 PROCEDURE — 80053 COMPREHEN METABOLIC PANEL: CPT

## 2021-12-05 PROCEDURE — 87077 CULTURE AEROBIC IDENTIFY: CPT

## 2021-12-05 PROCEDURE — 99283 EMERGENCY DEPT VISIT LOW MDM: CPT

## 2021-12-05 PROCEDURE — 83690 ASSAY OF LIPASE: CPT

## 2021-12-05 PROCEDURE — 99284 EMERGENCY DEPT VISIT MOD MDM: CPT

## 2021-12-05 PROCEDURE — 93010 ELECTROCARDIOGRAM REPORT: CPT

## 2021-12-05 PROCEDURE — 84702 CHORIONIC GONADOTROPIN TEST: CPT

## 2021-12-05 PROCEDURE — 93005 ELECTROCARDIOGRAM TRACING: CPT

## 2021-12-05 PROCEDURE — 85025 COMPLETE CBC W/AUTO DIFF WBC: CPT

## 2021-12-05 RX ORDER — ACETAMINOPHEN 500 MG
650 TABLET ORAL ONCE
Refills: 0 | Status: COMPLETED | OUTPATIENT
Start: 2021-12-05 | End: 2021-12-05

## 2021-12-05 NOTE — ED PROVIDER NOTE - CLINICAL SUMMARY MEDICAL DECISION MAKING FREE TEXT BOX
35F with PMH/PSH including left salpingooophorectomy for TOA last year p/w mild dull left flank pain x 2 weeks. Also notes frequent nosebleeds over the same time course. Also note bilateral hand tingling but states that has been present for almost a year. Denies any other symptoms. Pt well appearing, HD stable, abd benign, no CVA TTP, no urinary symptoms, PERC negative. Low suspicion for kidney stones given mild dull nature of pain. Will assess for UTI, pyelo, lab abnormalities and reassess after pain meds.

## 2021-12-05 NOTE — ED ADULT NURSE NOTE - OBJECTIVE STATEMENT
Pt presents to ED with c/o left flank pain, intermittent numbness to both hands, and nosebleed x few weeks

## 2021-12-05 NOTE — ED PROVIDER NOTE - PHYSICAL EXAMINATION
Gen: AAOx3, non-toxic  Head: NCAT  HEENT: EOMI, oral mucosa moist, normal conjunctiva  Lung: CTAB, no respiratory distress, no wheezes/rhonchi/rales B/L, speaking in full sentences  CV: RRR, no murmurs, rubs or gallops  Abd: soft, NTND, no guarding, no CVA tenderness  MSK: no visible deformities  Neuro: No focal sensory or motor deficits, normal CN exam   Skin: Warm, well perfused, no rash  Psych: normal affect.     Michael Palomino, DO

## 2021-12-05 NOTE — ED PROVIDER NOTE - PROGRESS NOTE DETAILS
Pt has been sleeping comfortably. Feeling much better on re-examination, denies any symptoms. Will dc pending UA results. Pt will f/u with her PCP on Monday and return to the ER if symptoms return/worsen. UA findings likely contamination given minimal wbcs/leuks and many epithelials. Pt denies any urinary symptoms. Will not treat with abx. Pt will f/u with her pcp Monday.

## 2021-12-05 NOTE — ED PROVIDER NOTE - PATIENT PORTAL LINK FT
You can access the FollowMyHealth Patient Portal offered by [facility] by registering at the following website: [email or web address]. By joining [employer]’s FollowMyHealth portal, you will also be able to view your health information using other applications (apps) compatible with our system.

## 2021-12-05 NOTE — ED PROVIDER NOTE - OBJECTIVE STATEMENT
35F with PMH/PSH including left salpingooophorectomy for TOA last year p/w mild dull left flank pain x 2 weeks. Also notes frequent nosebleeds over the same time course. Also note bilateral hand tingling but states that has been present for almost a year. Denies any other symptoms including fever, cough, chest pain, SOB, abd pain, N/V/D, urinary symptoms. Also denies any leg pain/swelling, hemoptysis, history of DVT/PE, malignancy, hormone use, recent surgery, immobilization, or trauma.

## 2021-12-08 LAB
CULTURE RESULTS: SIGNIFICANT CHANGE UP
SPECIMEN SOURCE: SIGNIFICANT CHANGE UP

## 2022-06-20 NOTE — PROGRESS NOTE ADULT - SUBJECTIVE AND OBJECTIVE BOX
Patient seen at [facility] reports to feels nausea today. no vomitting. +bm a few times yesturday due to prune juice.  + Ambulation, voiding without difficulty, + flatus; tolerating regular diet. Denies HA, CP, SOB, N/V/D,  no bm; dizziness, palpitations, worsening abdominal pain, worsening vaginal bleeding, or any other concerns.     Vital Signs Last 24 Hrs  T(C): 36.5 (18 Nov 2020 06:49), Max: 36.9 (17 Nov 2020 14:17)  T(F): 97.7 (18 Nov 2020 06:49), Max: 98.4 (17 Nov 2020 14:17)  HR: 80 (18 Nov 2020 06:49) (80 - 90)  BP: 142/80 (18 Nov 2020 06:49) (134/89 - 142/80)  BP(mean): --  RR: 18 (18 Nov 2020 06:49) (18 - 18)  SpO2: 98% (18 Nov 2020 06:49) (98% - 100%)    Gen: A&O x 3, NAD  Chest: CTA B/L  Cardiac: S1,S2  RRR  Abdomen: +BS; soft; Nontender, soft distention; no rebound no guarding; staples in place  Gyn: no vaginal bleeding   Extremities: Nontender, no worsening edema                          7.8    11.02 )-----------( 418      ( 17 Nov 2020 07:46 )             26.2             A/P: HD#6  POD # 4 s/p LSO for TOA; sepsis now with nausea; suspect illeus  abdomain x ray; npo 24hours  -cont pain management  -follow up cbc/bmp  -advance diet to regular after flatus  -oob, encourage ambulation  -lovenox DVT ppx  -d/w dr. francisco andrade 1f/glasses

## 2023-03-22 NOTE — ED PROVIDER NOTE - CHIEF COMPLAINT
Pharmacy called and said that Lantus is not covered by insurance but basagular is and they have a co pay card to go with. Asking for a script for Basagular. The patient is a 32y Female complaining of asthma exacerbation.

## 2023-10-09 NOTE — PHYSICAL EXAM
[Appropriately responsive] : appropriately responsive [Alert] : alert [No Acute Distress] : no acute distress [No Lymphadenopathy] : no lymphadenopathy [Regular Rate Rhythm] : regular rate rhythm [No Murmurs] : no murmurs [Clear to Auscultation B/L] : clear to auscultation bilaterally [Soft] : soft [Non-tender] : non-tender [Non-distended] : non-distended [No HSM] : No HSM [No Lesions] : no lesions [No Mass] : no mass [Oriented x3] : oriented x3 [Examination Of The Breasts] : a normal appearance [No Masses] : no breast masses were palpable [Labia Majora] : normal [Labia Minora] : normal [Discharge] : a  ~M vaginal discharge was present [Moderate] : moderate [White] : white [Thick] : thick [Normal] : normal [Uterine Adnexae] : normal Mart-1 - Negative Histology Text: MART-1 staining demonstrates a normal density and pattern of melanocytes along the dermal-epidermal junction. The surgical margins are negative for tumor cells.

## 2023-11-09 NOTE — CONSULT NOTE ADULT - SUBJECTIVE AND OBJECTIVE BOX
As tolerated
    Patient is a 34y old  Female who presents with a chief complaint of acute onset severe abdominal pain, vomiting, high suspicion for left TOA (2020 16:39)  ADmitted for L tuboovarian abscess and peritonitis.    IM team was consulted for persistent tachycardia.       MEDICATIONS  (STANDING):  cefoTEtan  IVPB 2 Gram(s) IV Intermittent every 12 hours  cefoTEtan  IVPB      doxycycline IVPB      doxycycline IVPB 100 milliGRAM(s) IV Intermittent every 12 hours  lactated ringers. 1000 milliLiter(s) (125 mL/Hr) IV Continuous <Continuous>  montelukast 10 milliGRAM(s) Oral at bedtime  simethicone 80 milliGRAM(s) Chew every 4 hours  sodium chloride 0.9% lock flush 3 milliLiter(s) IV Push every 8 hours    MEDICATIONS  (PRN):  ketorolac   Injectable 15 milliGRAM(s) IV Push every 6 hours PRN Moderate Pain (4 - 6)  metoclopramide Injectable 10 milliGRAM(s) IV Push every 6 hours PRN Nausea and/or Vomiting  morphine  - Injectable 4 milliGRAM(s) IV Push every 4 hours PRN Severe Pain (7 - 10)  ondansetron Injectable 4 milliGRAM(s) IV Push every 6 hours PRN Nausea and/or Vomiting      __________________________________________________  REVIEW OF SYSTEMS:    CONSTITUTIONAL: fever overnight  EYES: no acute visual disturbances  NECK: No pain or stiffness  RESPIRATORY: Occasional SOB   CARDIOVASCULAR: Occasional palpitations   GASTROINTESTINAL: severe abd pain   NEUROLOGICAL: No headache or numbness, no tremors  MUSCULOSKELETAL: No joint pain, no muscle pain  GENITOURINARY: no dysuria, no frequency, no hesitancy  PSYCHIATRY: mild anxiety   ALL OTHER  ROS negative        Vital Signs Last 24 Hrs  T(C): 37.4 (2020 14:02), Max: 38.6 (2020 00:03)  T(F): 99.3 (2020 14:02), Max: 101.5 (2020 00:03)  HR: 121 (2020 14:02) (102 - 127)  BP: 121/74 (2020 14:02) (113/73 - 132/70)  BP(mean): --  RR: 18 (2020 14:02) (17 - 19)  SpO2: 97% (2020 14:02) (94% - 100%)    ________________________________________________  PHYSICAL EXAM:  GENERAL: NAD, young self ambulatory, not in any distress  HEENT: Normocephalic;  conjunctivae and sclerae clear; moist mucous membranes;   NECK : supple  CHEST/LUNG: Clear to auscultation bilaterally with good air entry   HEART: S1 S2 tachycardia+, no murmur  ABDOMEN: Soft, diffuse tenderness, old c-sec scars, BS present   EXTREMITIES: no cyanosis; no edema; no calf tenderness  SKIN: warm and dry; no rash  NERVOUS SYSTEM:  Awake and alert; Oriented  to place, person and time ; no new deficits    _________________________________________________  LABS:                        9.8    21.26 )-----------( 435      ( 2020 09:21 )             33.0     11-13    137  |  101  |  9   ----------------------------<  122<H>  3.6   |  28  |  0.92    Ca    8.8      2020 01:16    TPro  7.9  /  Alb  2.6<L>  /  TBili  0.3  /  DBili  x   /  AST  13  /  ALT  17  /  AlkPhos  93  11-13    PT/INR - ( 2020 09:21 )   PT: 17.6 sec;   INR: 1.51 ratio         PTT - ( 2020 09:21 )  PTT:26.9 sec  Urinalysis Basic - ( 2020 01:16 )    Color: Yellow / Appearance: Clear / S.020 / pH: x  Gluc: x / Ketone: Negative  / Bili: Negative / Urobili: Negative   Blood: x / Protein: 15 / Nitrite: Negative   Leuk Esterase: Negative / RBC: 0-2 /HPF / WBC 0-2 /HPF   Sq Epi: x / Non Sq Epi: Few /HPF / Bacteria: Few /HPF      CAPILLARY BLOOD GLUCOSE            RADIOLOGY & ADDITIONAL TESTS:  < from: CT Abdomen and Pelvis w/ Oral Cont and w/ IV Cont (20 @ 04:58) >    IMPRESSION:  1. Large irregular fluid collection expanding the left ovary. Additional small left adnexal fluid collection. Moderate free fluid in the pelvis with possible peritoneal enhancement. The right ovary is also enlarged. Pelvic ultrasound is recommended to evaluate. Primary considerations on the left are ruptured hemorrhagic cyst and tubo-ovarian abscess.  2. The appearance is suspicious for enteritis.  3. Probable 3.7 cm fibroid.          Imaging Personally Reviewed:  YES/NO    Consultant(s) Notes Reviewed:   YES/ No    Care Discussed with Consultants :     Plan of care was discussed with patient and /or primary care giver; all questions and concerns were addressed and care was aligned with patient's wishes.    
HPI: 33 y/o   LMP: 10/26/2020 presents to the ED with acute onset of acute abdominal pain since yesterday. Pt states she had a hamburger earlier yesterday afternoon and after she ate she started to feel severe abdominal pain with 4 episodes of vomiting and diarrhea. Pt states she had similar pain 1 month ago after eating beef and went to [facility] ED and was told she had "abdominal inflammation." Pt denies any vaginal bleeding, vaginal discharge, cp, sob, dizziness, palpitations, n/v/d/c, fever, chills or any other complaints.   GYN care at [facility] (Dr. Gem Crockett)    pob/gynhx:   1 mETOP  1 etop w d&c   1    2013 1 c/s   Pt states she has one fibriod and ovarian cyst, pt denies std's, abn pap smear  reports regular menses, lasting 5 days long  pmhx: asthma, anemia   pshx: c/s x1, d&c x1  all: pineapple, kiwi, shellfish   meds: no meds  sochx: Pt denies etoh/drug/tobacco use  pt denies h/o anxiety or depression    REVIEW OF SYSTEMS: see HPI	    PE:  Vital Signs Last 24 Hrs  T(C): 38.4 (2020 05:14), Max: 38.4 (2020 05:14)  T(F): 101.1 (2020 05:14), Max: 101.1 (2020 05:14)  HR: 120 (2020 05:14) (117 - 120)  BP: 126/81 (2020 05:14) (123/70 - 126/81)  BP(mean): --  RR: 18 (2020 05:14) (18 - 18)  SpO2: 98% (2020 05:14) (98% - 98%)    Gen: A&Ox3, NAD, appears in pain and ambulating very slowly  Abd/pelvic exam: unable to perform at this time, pt was called to radiology for pelvic sonogram     LABS:                        9.3    17.96 )-----------( 433      ( 2020 01:16 )             31.0     11-13    137  |  101  |  9   ----------------------------<  122<H>  3.6   |  28  |  0.92    Ca    8.8      2020 01:16    TPro  7.9  /  Alb  2.6<L>  /  TBili  0.3  /  DBili  x   /  AST  13  /  ALT  17  /  AlkPhos  93  11-13    PT/INR - ( 2020 01:16 )   PT: 14.5 sec;   INR: 1.23 ratio         PTT - ( 2020 01:16 )  PTT:29.8 sec  Urinalysis Basic - ( 2020 01:16 )    Color: Yellow / Appearance: Clear / S.020 / pH: x  Gluc: x / Ketone: Negative  / Bili: Negative / Urobili: Negative   Blood: x / Protein: 15 / Nitrite: Negative   Leuk Esterase: Negative / RBC: 0-2 /HPF / WBC 0-2 /HPF   Sq Epi: x / Non Sq Epi: Few /HPF / Bacteria: Few /HPF      RADIOLOGY & ADDITIONAL STUDIES:  c< from: CT Abdomen and Pelvis w/ Oral Cont and w/ IV Cont (20 @ 04:58) >    EXAM:  CT ABDOMEN AND PELVIS OC IC                            PROCEDURE DATE:  2020        INTERPRETATION:  CLINICAL INFORMATION:  Abdominal pain, leukocytosis  COMPARISON: None.  PROCEDURE:  CT of the Abdomen and Pelvis was performed with intravenous contrast.  Intravenous contrast:   90 ml Omnipaque 350.  Oral contrast: positive contrast was administered.  Sagittal and coronal reformats were performed.    FINDINGS:  LIMITATIONS: None.    LOWER CHEST: Within normal limits.  ABDOMINAL WALL: Within normal limits.  VESSELS: Within normal limits.  BOWEL: Loops of small bowel with moderate mural thickening of loss of the abdomen and perienteric fat stranding/fluid. No gross abnormalities of the colon. Appendix normal.    LIVER: Within normal limits.  BILE DUCTS: Normal caliber  GALLBLADDER: Within normal limits.  SPLEEN: Within normal limits.  PANCREAS: Within normal limits.  ADRENALS: Within normal limits.  KIDNEYS/URETERS: No renal stones or hydronephrosis.    BLADDER: Within normal limits.  REPRODUCTIVE ORGANS: 3.7 cm hyperdense lesion within the dorsal myometrium, likely fibroid. Right ovary is enlarged, measuring approximately 5.2 x 3.8 x 5.0 cm. The left ovary is enlarged by an irregular, peripherally enhancing fluid collection measuring approximately 6.0 x 3.6 x 4.5 cm. Additional smaller fluid collection in the left adnexa measuring 2.8 cm (series 2, image 101).    PERITONEUM: Moderate free fluid in the pelvis with possible peritoneal enhancement.  RETROPERITONEUM/LYMPH NODES: Nonspecific mildly enlarged left retroperitoneal lymph nodes measuring up to 1.1 cm in short axis.    BONES: No acute osseous pathology.    IMPRESSION:  1. Large irregular fluid collection expanding the left ovary. Additional small left adnexal fluid collection. Moderate free fluid in the pelvis with possible peritoneal enhancement. The right ovary is also enlarged. Pelvic ultrasound is recommended to evaluate. Primary considerations on the left are ruptured hemorrhagic cyst and tubo-ovarian abscess.  2. The appearance is suspicious for enteritis.  3. Probable 3.7 cm fibroid.    ERIC LEIVA MD; Attending Radiologist  This document has been electronically signed. 2020  5:17AM

## 2025-05-06 NOTE — ED PROVIDER NOTE - HIV OFFER
Problem: Adult Inpatient Plan of Care  Goal: Plan of Care Review  Description: The Plan of Care Review/Shift note should be completed every shift.  The Outcome Evaluation is a brief statement about your assessment that the patient is improving, declining, or no change.  This information will be displayed automatically on your shiftnote.  Outcome: Progressing  Flowsheets (Taken 5/6/2025 1432)  Plan of Care Reviewed With: patient  Goal: Absence of Hospital-Acquired Illness or Injury  Intervention: Identify and Manage Fall Risk  Recent Flowsheet Documentation  Taken 5/6/2025 1200 by Shannon Swanson RN  Safety Promotion/Fall Prevention: activity supervised  Intervention: Prevent Skin Injury  Recent Flowsheet Documentation  Taken 5/6/2025 1200 by Shannon Swanson RN  Body Position: position changed independently     Problem: Pain Acute  Goal: Optimal Pain Control and Function  Outcome: Progressing     Problem: Wound  Goal: Optimal Coping  Outcome: Progressing  Goal: Optimal Functional Ability  Intervention: Optimize Functional Ability  Recent Flowsheet Documentation  Taken 5/6/2025 1200 by Shannon Swanson RN  Activity Management: up in chair  Goal: Skin Health and Integrity  Intervention: Optimize Skin Protection  Recent Flowsheet Documentation  Taken 5/6/2025 1200 by Shannon Swanson RN  Activity Management: up in chair   Goal Outcome Evaluation:      Plan of Care Reviewed With: patient                  Opt out